# Patient Record
Sex: FEMALE | Race: WHITE | Employment: OTHER | URBAN - METROPOLITAN AREA
[De-identification: names, ages, dates, MRNs, and addresses within clinical notes are randomized per-mention and may not be internally consistent; named-entity substitution may affect disease eponyms.]

---

## 2022-07-19 ENCOUNTER — HOSPITAL ENCOUNTER (OUTPATIENT)
Dept: PHYSICAL THERAPY | Age: 67
Discharge: HOME | End: 2022-07-19
Payer: MEDICARE

## 2022-07-19 PROCEDURE — 97140 MANUAL THERAPY 1/> REGIONS: CPT

## 2022-07-19 PROCEDURE — 97110 THERAPEUTIC EXERCISES: CPT

## 2022-07-19 PROCEDURE — 97161 PT EVAL LOW COMPLEX 20 MIN: CPT

## 2022-07-19 NOTE — PROGRESS NOTES
PHYSICAL THERAPY - DAILY TREATMENT NOTE    Patient Name: Brittani Matute        Date: 2022  : 1955   YES Patient  Verified  Visit #:      12  Insurance: Payor: /      In time: 9:25 AM Out time: 10:15 AM   Total Treatment Time: 50 min      Medicare/[employer] Time Tracking (below)   Total Timed Codes (min):  24 min  1:1 Treatment Time:  50 min      TREATMENT AREA =  Shoulder pain, right [M25.511]  Left shoulder pain [M25.512]  Adhesive capsulitis of right shoulder [M75.01]  Adhesive capsulitis of left shoulder [M75.02]    SUBJECTIVE    Pain Level (on 0 to 10 scale):  2  / 10   Medication Changes/New allergies or changes in medical history, any new surgeries or procedures? NO    If yes, update Summary List   Subjective Functional Status/Changes:  []  No changes reported     See POC          OBJECTIVE    8 min Therapeutic Exercise:  [x]  See flow sheet   Rationale:      increase ROM and increase strength to improve the patients ability to perform ADLs with decreased symptoms and limitations. 10 min Manual Therapy: R shoulder GHJ mobs all planes gr 2-3, PROm ABD and ER   Rationale:      decrease pain, increase ROM and increase tissue extensibility to improve patient's ability to perform ADLs with decreased symptoms and limitations. The manual therapy interventions were performed at a separate and distinct time from the therapeutic activities interventions.     6  min Self Care: Reviewed diagnosis, prognosis, therapy progression   Rationale:    Improve understanding of injury and therapy to have realistic expectation of therapy to improve compliance/adherence and satisfaction    Billed With/As:   [x] TE   [] TA   [] Neuro   [x] Self Care Patient Education: [x] Review HEP    [] Progressed/Changed HEP based on:   [] positioning   [] body mechanics   [] transfers   [] heat/ice application    [] other:        Other Objective/Functional Measures:    Shown and performed HEP     Post Treatment Pain Level (on 0 to 10) scale:   4 / 10     ASSESSMENT  Assessment/Changes in Function:     See POC     []  See Progress Note/Recertification   Patient will continue to benefit from skilled PT services to modify and progress therapeutic interventions, address functional mobility deficits, address ROM deficits, address strength deficits, analyze and address soft tissue restrictions, analyze and cue movement patterns, analyze and modify body mechanics/ergonomics, assess and modify postural abnormalities, and instruct in home and community integration to attain goals per POC.    Progress toward goals / Updated goals:    See POC     PLAN  [x]  Upgrade activities as tolerated YES Continue plan of care   []  Discharge due to :    [x]  Other: 2x per week for 6 weeks     Therapist: Chet Gan, PT    Date: 7/19/2022 Time: 8:25 AM

## 2022-07-19 NOTE — PROGRESS NOTES
[address], [facility] [address],[facility], [address] [facility] - Phone: [phone]  Fax: 07-50-10-31 OF [facility] / [address]  Patient Name: Dewey Weiner : 1955   Medical   Diagnosis: Shoulder pain, right [M25.511]  Left shoulder pain [M25.512]  Adhesive capsulitis of right shoulder [M75.01]  Adhesive capsulitis of left shoulder [M75.02] Treatment Diagnosis: B shoulder pain   Onset Date: 2022     Referral Source: Charles Steen [facility]): 2022   Prior Hospitalization: See medical history Provider #: [id number]   Prior Level of Function: IND with all ADLs but does report prior bout of L arm pain in  which might have been similar   Comorbidities: Arthritis   Medications: Verified on Patient Summary List   The Plan of Care and following information is based on the information from the initial evaluation.   ===========================================================================================  Subjective: Dewey Weiner is a 69 y.o. F who presents to skilled PT with Dx of B shoulder pain. PAULA was gradual and just waking up with it. This had been going on since March. Pt had recent injections on  and  and that seemed to help out a lot. The shoulder pain has greatly reduced since then. Before, the pain was constant and depended on what she was doing. She reported of night pain. Night pain now is a lot better. Pt reports most difficulty with lifting the arms outstretched or lifting arms overhead. Feels the pain most in the ACJ area and acromion. Had MRIs for B shoulder showing some partial tears in B shoulders. Pt is R shoulder handed.     Pain:  Current: 2/10    Worst: 2/10 but 8/10 prior in injections   Best: 0/10    Objective:   FOTO score = 43 (an established functional score where 100 = no disability)    Posture: forward head posture, B forward shoulder with increased turgor in paraspinals and rhomboids and B UT     Functional Activity:  Functional ER - (L) T3 p!    (R) T1 no p! Functional IR - (L) T10 p!     (R) L1 p! Shoulder AROM   L flexion 146 deg tightness         R flexion 140 deg p! Coming down   L  deg discomfort     R  deg p! Shoulder PROM   L flexion 130 deg p! R flexion 140 deg   L  deg p!    R ABD 90 deg p! L ER 70 deg p!    R ER 40 deg p! L IR 70 deg p!     R IR 60 deg     Shoulder MMT in neutral   L flexion 4/5 p! R flexion 4+/5  L ABD 4/5    R ABD 4/5  L ER 4/5     R ER 4.5  L IR 4+/5     R IR 4/5 p!     (-) belly press test   (-) Er lag sign  (+) B shoulder impingement     Assessment / key information:  Judith Moeller is a 69 y.o. F who presents to skilled PT for the treatment diagnosis of B shoulder pain. Pt is still limited in all planes of motion for B shoulder with AROM and PROM due to tightness/restriction and pain. Her pain is resolving since the injections, but not completely gone limiting her functional mobility. Pt would benefit form skilled PT services addressing the current ROM, strength, functional performance, and balance impairments so that the patient to return to performing all ADLs with minimal restriction/limitation or without any functional limitations. HEP provided for the patient in order to promote improvement and self management for symptoms and functional performance:   Access Code: [email or web address]  URL: [email or web address]  Date: 07/19/2022  Prepared by: Chelsy Long    Exercises  Seated Shoulder Flexion Towel Slide at Table Top - 1-2 x daily - 7 x weekly - 10 reps - 3\" hold  Seated Shoulder Abduction Towel Slide at Table Top with Forearm in Neutral - 1-2 x daily - 7 x weekly - 10 reps - 3\" hold  Supine Shoulder External Rotation in 45 Degrees Abduction AAROM with Dowel - 1-2 x daily - 7 x weekly - 5 reps - 3\" hold      ===========================================================================================  Eval Complexity: History LOW Complexity : Zero comorbidities / personal factors that will impact the outcome / POC;  Examination  LOW Complexity : 1-2 Standardized tests and measures addressing body structure, function, activity limitation and / or participation in recreation ; Presentation LOW Complexity : Stable, uncomplicated ;  Decision Making MEDIUM Complexity : FOTO score of 26-74; Overall Complexity LOW   Problem List: pain affecting function, decrease ROM, decrease strength, decrease ADL/ functional abilitiies, decrease activity tolerance and decrease flexibility/ joint mobility   Treatment Plan may include any combination of the following: Therapeutic exercise, Therapeutic activities, Neuromuscular re-education, Physical agent/modality, Manual therapy, Patient education, Self Care training and Functional mobility training  Patient / Family readiness to learn indicated by: asking questions, trying to perform skills, and interest  Persons(s) to be included in education: patient (P)  Barriers to Learning/Limitations: None  Measures taken, if barriers to learning:    Patient Goal (s): \"permanent pain relief\"   Patient self reported health status: good  Rehabilitation Potential: good   Short Term Goals: To be accomplished in  3  weeks. 1) Pt will be IND with HEP to facilitate self care management. 2) Pt will improve B shoulder flexion AROM to >160 deg  And R shoulder ABD AROM to >130 deg with </= 3/10 pain to order to improve ability to reach overhead with ease    Long Term Goals: To be accomplished in  6 weeks. 1) Pt will improve B shoulder flexion/ABD AROM to >160 deg with </= 3/10  to order to improve ability to reach overhead with ease. 2) Pt will improve FOTO scores to 62 in order to show detectable change in overall function.    3) Pt will be able to perform reaching behind her back to T8 with </= 3/10 signifying improvement in overall functional capacity and activity tolerance. Frequency / Duration:   Patient to be seen  2  times per week for 6  weeks. Patient / Caregiver education and instruction: self care, activity modification, and exercises  Therapist Signature: oRse Mary Rodriguez PT Date: 0/60/2703   Certification Period: 7/19/2022 to 10/19/2022 Time: 8:24 AM   ===========================================================================================  I certify that the above Physical Therapy Services are being furnished while the patient is under my care. I agree with the treatment plan and certify that this therapy is necessary. Physician Signature:        Date:       Time:                                        Charles Genao  Please sign and return to [facility] AT [facility] at [facility] [place], [facility]. or you may fax the signed copy to [phone]. Thank you.

## 2022-07-26 ENCOUNTER — HOSPITAL ENCOUNTER (OUTPATIENT)
Dept: PHYSICAL THERAPY | Age: 67
Discharge: HOME | End: 2022-07-26
Payer: MEDICARE

## 2022-07-26 PROCEDURE — 97140 MANUAL THERAPY 1/> REGIONS: CPT

## 2022-07-26 PROCEDURE — 97110 THERAPEUTIC EXERCISES: CPT

## 2022-07-26 NOTE — PROGRESS NOTES
PHYSICAL THERAPY - DAILY TREATMENT NOTE    Patient Name: Lowell Brar        Date: 2022  : 1955   yes Patient  Verified  Visit #:      12  Insurance: Payor: [employer] / Plan: [place] MEDICARE PART A & B / Product Type: Medicare /      In time: 950 Out time: 1055   Total Treatment Time: 65     Medicare/BCBS Time Tracking (below)   Total Timed Codes (min):  65 1:1 Treatment Time:  55     TREATMENT AREA =  Shoulder pain, right [M25.511]  Left shoulder pain [M25.512]  Adhesive capsulitis of right shoulder [M75.01]  Adhesive capsulitis of left shoulder [M75.02]    SUBJECTIVE  Pain Level (on 0 to 10 scale):  4  / 10   Medication Changes/New allergies or changes in medical history, any new surgeries or procedures? yes  If yes, update Summary List   Subjective Functional Status/Changes:  []  No changes reported     I can feel the tightness in both my shoulders. Patient feels significantly better since the injections. Reports compliance with HEP but continues to feel the tightness.          OBJECTIVE  Modalities Rationale:     decrease inflammation, decrease pain, and increase tissue extensibility to improve patient's ability to perform general ADLs with UEs and decrease c/o symptoms   min [] Estim, type/location:                                      []  att     []  unatt     []  w/US     []  w/ice    []  w/heat    min []  Mechanical Traction: type/lbs                   []  pro   []  sup   []  int   []  cont    []  before manual    []  after manual    min []  Ultrasound, settings/location:      min []  Iontophoresis w/ dexamethasone, location:                                               []  take home patch       []  in clinic   10 min [x]  Ice     []  Heat    location/position: Supine (B) shoulders    min []  Vasopneumatic Device, press/temp:    If using vaso (only need to measure limb vaso being performed on)      pre-treatment girth :       post-treatment girth :       measured at (landmark location) :      min []  Other:    [x] Skin assessment post-treatment (if applicable):    [x]  intact    []  redness- no adverse reaction                  []redness - adverse reaction:      25 min Therapeutic Exercise:  [x]  See flow sheet   Rationale:      increase ROM, increase strength, and improve coordination to improve the patients ability to perform general ADLs with UEs and decrease c/o symptoms     30 min Manual Therapy: (B) UE: DTM UT/supra, CFm along biceps tenon region, PROM in all directions, grade 2 GH mob. Rationale:      decrease pain, increase ROM, and increase tissue extensibility to improve patient's ability to perform general ADLs with UEs and decrease c/o symptoms  The manual therapy interventions were performed at a separate and distinct time from the therapeutic activities interventions. Billed With/As:   [x] TE   [] TA   [] Neuro   [] Self Care Patient Education: [x] Review HEP    [] Progressed/Changed HEP based on:   [] positioning   [] body mechanics   [] transfers   [] heat/ice application    [] other:      Other Objective/Functional Measures:    No change in functional measurements today. Patient reports an increase in her ablitlity to perform all general ADLs. Post Treatment Pain Level (on 0 to 10) scale:   0  / 10     ASSESSMENT  Assessment/Changes in Function:     Overall good tolerance to all therapeutic interventions for first treatment session. Advised patient of DOMS and to use ice prn 10 - 15 minutes. []  See Progress Note/Recertification   Patient will continue to benefit from skilled PT services to modify and progress therapeutic interventions, address functional mobility deficits, address ROM deficits, address strength deficits, analyze and address soft tissue restrictions, and analyze and cue movement patterns to attain remaining goals. Progress toward goals / Updated goals:    Short Term Goals: To be accomplished in  3  weeks.   1) Pt will be IND with HEP to facilitate self care management. 2) Pt will improve B shoulder flexion AROM to >160 deg  And R shoulder ABD AROM to >130 deg with </= 3/10 pain to order to improve ability to reach overhead with ease   Long Term Goals: To be accomplished in  6 weeks. 1) Pt will improve B shoulder flexion/ABD AROM to >160 deg with </= 3/10  to order to improve ability to reach overhead with ease. 2) Pt will improve FOTO scores to 62 in order to show detectable change in overall function. 3) Pt will be able to perform reaching behind her back to T8 with </= 3/10 signifying improvement in overall functional capacity and activity tolerance. No change toward goals today.      PLAN  []  Upgrade activities as tolerated yes Continue plan of care   []  Discharge due to :    []  Other:      Therapist: Mckenna Bergman PTA    Date: 7/26/2022 Time: 7:26 AM     Future Appointments   Date Time Provider Noy Garcia   7/26/2022  9:45 AM Cathy De Jesus [facility]   7/29/2022  9:45 AM Larry Esparza, PT [id number]   8/2/2022  8:30 AM Admire Netter, PT [facility]   8/4/2022  8:30 AM Admire Netter, PT [facility]   8/9/2022  8:30 AM Admire Netter, PT [facility]   8/11/2022  8:30 AM Admire Netter, PT [facility]   8/16/2022  8:30 AM Admire Netter, PT [facility]   8/18/2022  8:30 AM Admire Netter, PT [facility]   8/23/2022  8:30 AM Admire Netter, PT [facility]   8/25/2022  8:30 AM Admire Netter, PT [facility]

## 2022-07-29 ENCOUNTER — HOSPITAL ENCOUNTER (OUTPATIENT)
Dept: PHYSICAL THERAPY | Age: 67
Discharge: HOME | End: 2022-07-29
Payer: MEDICARE

## 2022-07-29 PROCEDURE — 97140 MANUAL THERAPY 1/> REGIONS: CPT

## 2022-07-29 PROCEDURE — 97110 THERAPEUTIC EXERCISES: CPT

## 2022-07-29 NOTE — PROGRESS NOTES
PHYSICAL THERAPY - DAILY TREATMENT NOTE    Patient Name: Binta Pabon        Date: 2022  : 1955   yes Patient  Verified  Visit #:   3   of   12  Insurance: Payor: Malka Álvarez / Plan: [place] MEDICARE PART A & B / Product Type: Medicare /      In time: 9:45 am Out time: 10:45 am   Total Treatment Time: 60     Medicare/BCBS Time Tracking (below)   Total Timed Codes (min):  50 1:1 Treatment Time:  50     TREATMENT AREA =  Shoulder pain, right [M25.511]  Left shoulder pain [M25.512]  Adhesive capsulitis of right shoulder [M75.01]  Adhesive capsulitis of left shoulder [M75.02]    SUBJECTIVE  Pain Level (on 0 to 10 scale):  1  / 10   Medication Changes/New allergies or changes in medical history, any new surgeries or procedures?    no  If yes, update Summary List   Subjective Functional Status/Changes:  []  No changes reported     Patient reports feeling \"pinchy\" and uncomfortable in her shoulder. Stiffness remains. States it is more discomfort than true pain. OBJECTIVE  Modalities Rationale:     decrease inflammation and decrease pain to improve patient's ability to reach over head, lift and carry items.    min [] Estim, type/location:                                      []  att     []  unatt     []  w/US     []  w/ice    []  w/heat    min []  Mechanical Traction: type/lbs                   []  pro   []  sup   []  int   []  cont    []  before manual    []  after manual    min []  Ultrasound, settings/location:      min []  Iontophoresis w/ dexamethasone, location:                                               []  take home patch       []  in clinic   10 min [x]  Ice     []  Heat    location/position: Bilateral shoulders, longsitting    min []  Vasopneumatic Device, press/temp:    If using vaso (only need to measure limb vaso being performed on)      pre-treatment girth :       post-treatment girth :       measured at (landmark location) :      min []  Other:    [x] Skin assessment post-treatment (if applicable):    [x]  intact    []  redness- no adverse reaction                  []redness - adverse reaction:      20 min Therapeutic Exercise:  [x]  See flow sheet   Rationale:      increase ROM and increase strength to improve the patients ability to each over head, lift and carry items. 30 min Manual Therapy: PROM to bilateral shoulders, trigger point release to bilateral lats. Rationale:      decrease pain, increase ROM, increase tissue extensibility, decrease trigger points, and increase postural awareness to improve patient's ability to each over head, lift and carry items. The manual therapy interventions were performed at a separate and distinct time from the therapeutic activities interventions. Billed With/As:   [x] TE   [] TA   [] Neuro   [] Self Care Patient Education: [x] Review HEP    [] Progressed/Changed HEP based on:   [] positioning   [] body mechanics   [] transfers   [] heat/ice application    [] other:      Other Objective/Functional Measures:    Verbal or Tactile cues required: TC used for good scap setting for therex. Vcs required for counting and arm positioning  Posture/positioning: Good and upright with 3 VCs  ROM: Right Flexion 170 deg; Scap 165 deg; IR to hip, ER to 80 deg; Left Flexion 170 deg; Scap 170 deg; IR to hip; ER 90 deg  Palpation: Lats moderately TTP with multiple mTrPs. Therex and manual therapies as per flow sheet. Post Treatment Pain Level (on 0 to 10) scale:   2-3  / 10     ASSESSMENT  Assessment/Changes in Function:     Patient tolerated today's treatment well. Progressed/added therex as follows: doorway stretch, and cane extension and IR. Discussed added HEP with patient. Patient is agreeable. Continue to progress as tolerated.      []  See Progress Note/Recertification   Patient will continue to benefit from skilled PT services to modify and progress therapeutic interventions, address functional mobility deficits, address ROM deficits, address strength deficits, analyze and address soft tissue restrictions, analyze and cue movement patterns, analyze and modify body mechanics/ergonomics, and assess and modify postural abnormalities to attain remaining goals. Progress toward goals / Updated goals:    Patient making good progress towards all goals at this time. ROM improving well. Strength improving. All goals remain applicable.      PLAN  [x]  Upgrade activities as tolerated yes Continue plan of care   []  Discharge due to :    []  Other:      Therapist: Aleta Loza PT    Date: 7/29/2022 Time: 9:49 AM     Future Appointments   Date Time Provider Noy Garcia   8/2/2022  8:30 AM Kiara Cord, PT [facility]   8/4/2022  8:30 AM Kiara Cord, PT [facility]   8/9/2022  8:30 AM Kiara Cord, PT [facility]   8/11/2022  8:30 AM Kiara Cord, PT [facility]   8/16/2022  8:30 AM Kiara Cord, PT [facility]   8/18/2022  8:30 AM Kiara Cord, PT [facility]   8/23/2022  8:30 AM Kiara Cord, PT [facility]   8/25/2022  8:30 AM Kiara Cord, PT [facility]

## 2022-08-02 ENCOUNTER — HOSPITAL ENCOUNTER (OUTPATIENT)
Dept: PHYSICAL THERAPY | Age: 67
Discharge: HOME | End: 2022-08-02
Payer: MEDICARE

## 2022-08-02 PROCEDURE — 97110 THERAPEUTIC EXERCISES: CPT

## 2022-08-02 PROCEDURE — 97140 MANUAL THERAPY 1/> REGIONS: CPT

## 2022-08-02 NOTE — PROGRESS NOTES
PHYSICAL THERAPY - DAILY TREATMENT NOTE    Patient Name: Louise Portillo        Date: 2022  : 1955   yes Patient  Verified  Visit #:     Insurance: Payor: Shira Orourke / Plan: [place] MED[place] PART [place] & B / Product Type: Medicare /      In time: 8:30 AM Out time: 920   Total Treatment Time: 50     Medicare/BCBS Time Tracking (below)   Total Timed Codes (min):  40 1:1 Treatment Time:  50     TREATMENT AREA =  Shoulder pain, right [M25.511]  Left shoulder pain [M25.512]  Adhesive capsulitis of right shoulder [M75.01]  Adhesive capsulitis of left shoulder [M75.02]    SUBJECTIVE  Pain Level (on 0 to 10 scale):  1  / 10   Medication Changes/New allergies or changes in medical history, any new surgeries or procedures?    no  If yes, update Summary List   Subjective Functional Status/Changes:  []  No changes reported     Patient reports mostly soreness after the PT. Feels like more muscle sore. Not pain. Just sore. Still having trouble withj ROM and mobility           OBJECTIVE  Modalities Rationale:     decrease edema, decrease inflammation, and decrease pain to improve patient's ability to perform ADLs with decreased pain and improved mobility.    min [] Estim, type/location:                                      []  att     []  unatt     []  w/US     []  w/ice    []  w/heat    min []  Mechanical Traction: type/lbs                   []  pro   []  sup   []  int   []  cont    []  before manual    []  after manual    min []  Ultrasound, settings/location:      min []  Iontophoresis w/ dexamethasone, location:                                               []  take home patch       []  in clinic   10 min [x]  Ice     []  Heat    location/position: ON incline: B shoulder    min []  Vasopneumatic Device, press/temp:    If using vaso (only need to measure limb vaso being performed on)      pre-treatment girth :       post-treatment girth :       measured at (landmark location) :      min []  Other:    [] Skin assessment post-treatment (if applicable):    []  intact    []  redness- no adverse reaction                  []redness - adverse reaction:      10 min Therapeutic Exercise:  [x]  See flow sheet  B LAX ball to post cuff 30\" each   Rationale:      increase ROM, increase strength, improve coordination, improve balance, and increase proprioception to improve the patients ability to perform general ADLs with decrease c/o symptoms and with improved functional performance. 30 min Manual Therapy: R shoulder: TPR post cuff, lat/CFM post capsule/GHJ AP and inf mob gr 3-4/stretching ABD/ER   Rationale:      decrease pain, increase ROM, increase tissue extensibility, decrease edema , and decrease trigger points to improve patient's ability to perform general ADLs with decrease c/o symptoms and with improved functional performance. The manual therapy interventions were performed at a separate and distinct time from the therapeutic activities interventions. Billed With/As:   [x] TE   [] TA   [] Neuro   [] Self Care Patient Education: [x] Review HEP    [] Progressed/Changed HEP based on:   [] positioning   [] body mechanics   [] transfers   [x] heat/ice application    [x] other: discussion on exercises to improve functional IR     Other Objective/Functional Measures:    R shoulder AORM   Flexion 149 deg   Functional IR T10    L shoulder AROM   Flexion 159   Functional IR T7     Post Treatment Pain Level (on 0 to 10) scale:   2  / 10     ASSESSMENT  Assessment/Changes in Function:     Patient tolerated today's treatment well with notbale improvement in R shoulder mobility post manual. L shoulder better stiffer post manual due to focus mostly on R shoulder today due to that being the more limited shoulder in all planes. Pt shown several exercise she can include to work on functional IR: LAX ball to post cuff and post cuff stretching.      []  See Progress Note/Recertification   Patient will continue to benefit from skilled PT services to modify and progress therapeutic interventions, address functional mobility deficits, address ROM deficits, address strength deficits, analyze and address soft tissue restrictions, analyze and cue movement patterns, analyze and modify body mechanics/ergonomics, assess and modify postural abnormalities, and instruct in home and community integration to attain remaining goals. Progress toward goals / Updated goals:    Patient is making steady progress towards their goals at this time. Her pain free ROM is improving showing progress to STG and LTG. All goals remain applicable at this time.      PLAN  [x]  Upgrade activities as tolerated yes Continue plan of care   []  Discharge due to :    []  Other:      Therapist: Shree Rm PT    Date: 8/2/2022 Time: 7:33 AM     Future Appointments   Date Time Provider Noy Garcia   8/2/2022  8:30 AM Elnita Grief, PT [facility]   8/4/2022  8:30 AM Elnita Grief, PT [facility]   8/9/2022  8:30 AM Elnita Grief, PT [facility]   8/11/2022  8:30 AM Elnita Grief, PT [facility]   8/16/2022  8:30 AM Elnita Grief, PT [facility]   8/18/2022  8:30 AM Elnita Grief, PT [facility]   8/23/2022  8:30 AM Elnita Grief, PT [facility]   8/25/2022  8:30 AM Elnita Grief, PT [facility]

## 2022-08-04 ENCOUNTER — HOSPITAL ENCOUNTER (OUTPATIENT)
Dept: PHYSICAL THERAPY | Age: 67
Discharge: HOME | End: 2022-08-04
Payer: MEDICARE

## 2022-08-04 PROCEDURE — 97140 MANUAL THERAPY 1/> REGIONS: CPT

## 2022-08-04 PROCEDURE — 97110 THERAPEUTIC EXERCISES: CPT

## 2022-08-04 NOTE — PROGRESS NOTES
PHYSICAL THERAPY - DAILY TREATMENT NOTE    Patient Name: Judith Moeller        Date: 2022  : 1955   yes Patient  Verified  Visit #:      12  Insurance: Payor: Moris Reynolds / Plan: [place] MEDICARE PART A & B / Product Type: Medicare /      In time: 8:31 AM Out time: 921 AM   Total Treatment Time: 50     Medicare/BCBS Time Tracking (below)   Total Timed Codes (min):  40 50     TREATMENT AREA =  Shoulder pain, right [M25.511]  Left shoulder pain [M25.512]  Adhesive capsulitis of right shoulder [M75.01]  Adhesive capsulitis of left shoulder [M75.02]    SUBJECTIVE  Pain Level (on 0 to 10 scale):  3  / 10   Medication Changes/New allergies or changes in medical history, any new surgeries or procedures?    no  If yes, update Summary List   Subjective Functional Status/Changes:  []  No changes reported     Patient reports she really had no issue with the R shoulder. But the L shoulder was bothering her. OBJECTIVE  Modalities Rationale:     decrease edema, decrease inflammation, and decrease pain to improve patient's ability to perform ADLs with decreased pain and improved mobility.    min [] Estim, type/location:                                      []  att     []  unatt     []  w/US     []  w/ice    []  w/heat    min []  Mechanical Traction: type/lbs                   []  pro   []  sup   []  int   []  cont    []  before manual    []  after manual    min []  Ultrasound, settings/location:      min []  Iontophoresis w/ dexamethasone, location:                                               []  take home patch       []  in clinic   10 min [x]  Ice     []  Heat    location/position: Incline B shoulders    min []  Vasopneumatic Device, press/temp:    If using vaso (only need to measure limb vaso being performed on)      pre-treatment girth :       post-treatment girth :       measured at (landmark location) :      min []  Other:    [] Skin assessment post-treatment (if applicable):    []  intact    [] redness- no adverse reaction                  []redness - adverse reaction:      15 min Therapeutic Exercise:  [x]  See flow sheet   Rationale:      increase ROM, increase strength, improve coordination, improve balance, and increase proprioception to improve the patients ability to perform general ADLs with decrease c/o symptoms and with improved functional performance. 30 min Manual Therapy: B shoulder: pin GHJ AP and inf mob gr 3-4/stretching ABD   Rationale:      decrease pain, increase ROM, increase tissue extensibility, decrease edema , and decrease trigger points to improve patient's ability to perform general ADLs with decrease c/o symptoms and with improved functional performance. The manual therapy interventions were performed at a separate and distinct time from the therapeutic activities interventions. Billed With/As:   [x] TE   [] TA   [] Neuro   [] Self Care Patient Education: [x] Review HEP    [] Progressed/Changed HEP based on:   [] positioning   [] body mechanics   [] transfers   [] heat/ice application    [] other:      Other Objective/Functional Measures:    L shoulder AROM   Flexion 125 deg    deg p! Post tx 150 deg     R shoulder AROM   Flexion 145 deg   Abd 125 deg      Post Treatment Pain Level (on 0 to 10) scale:   2  / 10     ASSESSMENT  Assessment/Changes in Function:     Patient tolerated today's treatment well. Very stiff inf capsule B shoulde rlimiting ABD AROM and PROM. Tolerated joint mobs well but did have some discomfort. Good imrpovement noted in L shoulder to 150 deg but minimal to no change in R shoulder. Will cont to address mobility impairments in future sessions .       []  See Progress Note/Recertification   Patient will continue to benefit from skilled PT services to modify and progress therapeutic interventions, address functional mobility deficits, address ROM deficits, address strength deficits, analyze and address soft tissue restrictions, analyze and cue movement patterns, analyze and modify body mechanics/ergonomics, assess and modify postural abnormalities, and instruct in home and community integration to attain remaining goals. Progress toward goals / Updated goals:    Patient is making steady progress towards their goals at this time. The ROM is improving but flutuates session to sesssion, but is making progress towards pain goals and ROM goals. All goals remain applicable at this time.      PLAN  [x]  Upgrade activities as tolerated yes Continue plan of care   []  Discharge due to :    []  Other:      Therapist: Lucy Stuart, PT    Date: 8/4/2022 Time: 6:53 AM     Future Appointments   Date Time Provider Noy Garcia   8/4/2022  8:30 AM Kevin Contra Costa,  [address] [facility]   8/9/2022  8:30 AM Kevin Contra Costa,  [facility]   8/11/2022  8:30 AM Kevin Contra Costa,  [address] [facility]   8/16/2022  8:30 AM Kevin Contra Costa,  [address] [facility]   8/18/2022  8:30 AM Kevin Contra Costa,  [address] [facility]   8/23/2022  8:30 AM Kevin Contra Costa,  [address] [facility]   8/25/2022  8:30 AM Kevin Contra Costa,  [address] [facility]

## 2022-08-09 ENCOUNTER — HOSPITAL ENCOUNTER (OUTPATIENT)
Dept: PHYSICAL THERAPY | Age: 67
Discharge: HOME | End: 2022-08-09
Payer: MEDICARE

## 2022-08-09 PROCEDURE — 97110 THERAPEUTIC EXERCISES: CPT

## 2022-08-09 PROCEDURE — 97140 MANUAL THERAPY 1/> REGIONS: CPT

## 2022-08-09 NOTE — PROGRESS NOTES
PHYSICAL THERAPY - DAILY TREATMENT NOTE    Patient Name: Braulio Russell        Date: 2022  : 1955   yes Patient  Verified  Visit #:     Insurance: Payor: Charlie Alt / Plan: [place] MEDICARE PART A & B / Product Type: Medicare /      In time: 8:30 AM Out time: 9:30 AM    Total Treatment Time: 60      Medicare/BCBS Time Tracking (below)   Total Timed Codes (min):  50 1:1 Treatment Time:  60     TREATMENT AREA =  Shoulder pain, right [M25.511]  Left shoulder pain [M25.512]  Adhesive capsulitis of right shoulder [M75.01]  Adhesive capsulitis of left shoulder [M75.02]    SUBJECTIVE  Pain Level (on 0 to 10 scale):  R 1  / 10 and L 3/10   Medication Changes/New allergies or changes in medical history, any new surgeries or procedures?    no  If yes, update Summary List   Subjective Functional Status/Changes:  []  No changes reported     Patient reports the L one has really bothered her since Thursday. Cannot do the pec stretch. Felt like something was off. Sometimes can be sitting there and she is aware it hurts. Sometimes feels catchy but not sharp. The right one still feels tightness. Does not hurt, but just feels tight. OBJECTIVE  Modalities Rationale:     decrease edema, decrease inflammation, and decrease pain to improve patient's ability to perform ADLs with decreased pain and improved mobility.    min [] Estim, type/location:                                      []  att     []  unatt     []  w/US     []  w/ice    []  w/heat    min []  Mechanical Traction: type/lbs                   []  pro   []  sup   []  int   []  cont    []  before manual    []  after manual    min []  Ultrasound, settings/location:      min []  Iontophoresis w/ dexamethasone, location:                                               []  take home patch       []  in clinic   10 min []  Ice     []  Heat    location/position: Incline B shoulder    min []  Vasopneumatic Device, press/temp:    If using vaso (only need to measure limb vaso being performed on)      pre-treatment girth :       post-treatment girth :       measured at (landmark location) :      min []  Other:    [] Skin assessment post-treatment (if applicable):    []  intact    []  redness- no adverse reaction                  []redness - adverse reaction:      15 min Therapeutic Exercise:  [x]  See flow sheet  B shoulder stretching ABD and ER per tolerance    Rationale:      increase ROM, increase strength, improve coordination, improve balance, and increase proprioception to improve the patients ability to perform general ADLs with decrease c/o symptoms and with improved functional performance. 35 min Manual Therapy: B shoulder: TPR supraspinatus, GHJ 2-3 inf, R GHJ PA gr 3-4,  L GHJ lateral distraction, DTM of post cuff    Rationale:      decrease pain, increase ROM, increase tissue extensibility, decrease edema , and decrease trigger points to improve patient's ability to perform general ADLs with decrease c/o symptoms and with improved functional performance. The manual therapy interventions were performed at a separate and distinct time from the therapeutic activities interventions. Billed With/As:   [x] TE   [] TA   [] Neuro   [] Self Care Patient Education: [x] Review HEP    [] Progressed/Changed HEP based on:   [] positioning   [] body mechanics   [] transfers   [x] heat/ice application    [] other: activity modification and HEP modification depending on pain     Other Objective/Functional Measures:    L shoulder AROM  Flexion 151 deg p! Coming down    deg p! Coming down      R shoulder AROM  Flexion 142 deg tight post tx 158 deg   Abd 155 deg tight post tx 163 deg     TTP along distal supraspinatus  Tightness noted B posterior cuff      Post Treatment Pain Level (on 0 to 10) scale:   3  / 10     ASSESSMENT  Assessment/Changes in Function:     Patient tolerated today's treatment well without change in L shoulder pain.  Very TTP along L distal supraspinatus. GHJ lateral distraction gr 1-2 reproduced some lateral shoulder pain. Tolerated shoulder PROM but more pain with AROM in L shoulder. R shoulder more tight in the GHJ with no pain end range ABD but pain end range ER. Goo dchnage in ABD ROM post manual, but no change in end rnage pain RE post manual. Instructed on activtiy modification and HEP modification to allow pain to decrease. []  See Progress Note/Recertification   Patient will continue to benefit from skilled PT services to modify and progress therapeutic interventions, address functional mobility deficits, address ROM deficits, address strength deficits, analyze and address soft tissue restrictions, analyze and cue movement patterns, analyze and modify body mechanics/ergonomics, assess and modify postural abnormalities, and instruct in home and community integration to attain remaining goals. Progress toward goals / Updated goals:    Patient is making good progress towards their goals at this time. Imrpoving in shoulder ROM for R shoulder and L is lagging behind showing mild progress towards STG2 and LTG1. All goals remain applicable at this time.      PLAN  [x]  Upgrade activities as tolerated yes Continue plan of care   []  Discharge due to :    []  Other:      Therapist: Tran Haney PT    Date: 8/9/2022 Time: 7:37 AM     Future Appointments   Date Time Provider Noy Garcia   8/9/2022  8:30 AM Eliana Field PT [facility]   8/11/2022  8:30 AM Eliana Field PT [facility]   8/16/2022  8:30 AM Eliana Field PT [facility]   8/18/2022  8:30 AM Eliana Field PT [facility]   8/23/2022  8:30 AM Eliana Field PT [facility]   8/25/2022  8:30 AM Eliana Field PT [facility]

## 2022-08-11 ENCOUNTER — HOSPITAL ENCOUNTER (OUTPATIENT)
Dept: PHYSICAL THERAPY | Age: 67
Discharge: HOME | End: 2022-08-11
Payer: MEDICARE

## 2022-08-11 PROCEDURE — 97110 THERAPEUTIC EXERCISES: CPT

## 2022-08-11 PROCEDURE — 97140 MANUAL THERAPY 1/> REGIONS: CPT

## 2022-08-11 NOTE — PROGRESS NOTES
PHYSICAL THERAPY - DAILY TREATMENT NOTE    Patient Name: Vicente Moses        Date: 2022  : 1955   yes Patient  Verified  Visit #:     Insurance: Payor: Etienne Shaver / Plan: [place] MEDICARE PART A & B / Product Type: Medicare /      In time: 274 Out time: 928   Total Treatment Time: 58     Medicare/BCBS Time Tracking (below)   Total Timed Codes (min):  48 58     TREATMENT AREA =  Shoulder pain, right [M25.511]  Left shoulder pain [M25.512]  Adhesive capsulitis of right shoulder [M75.01]  Adhesive capsulitis of left shoulder [M75.02]    SUBJECTIVE  Pain Level (on 0 to 10 scale):  L3  / 10 R 1/10   Medication Changes/New allergies or changes in medical history, any new surgeries or procedures?    no  If yes, update Summary List   Subjective Functional Status/Changes:  []  No changes reported     Patient reports the L shoulder is about the same. The R shoulder is doing pretty good. Took an 800 mg Ibiuprofen before which she usually did. Every time she moved her arm in a certain position it clicked          OBJECTIVE  Modalities Rationale:     decrease edema, decrease inflammation, and decrease pain to improve patient's ability to perform ADLs with decreased pain and improved mobility.                 min [] Estim, type/location:                                                                 []  att     []  unatt     []  w/US     []  w/ice    []  w/heat     min []  Mechanical Traction: type/lbs                    []  pro   []  sup   []  int   []  cont    []  before manual    []  after manual     min []  Ultrasound, settings/location:        min []  Iontophoresis w/ dexamethasone, location:                                                []  take home patch       []  in clinic   10 min [x]  Ice     []  Heat    location/position: Incline B shoulder     min []  Vasopneumatic Device, press/temp:     If using vaso (only need to measure limb vaso being performed on)      pre-treatment girth : post-treatment girth :      measured at (landmark location) :       min []  Other:     [] Skin assessment post-treatment (if applicable):    []  intact    []  redness- no adverse reaction                  []redness - adverse reaction:      10 min Therapeutic Exercise:  [x]  See flow sheet  B shoulder stretching ABD and ER per tolerance    Rationale:      increase ROM, increase strength, improve coordination, improve balance, and increase proprioception to improve the patients ability to perform general ADLs with decrease c/o symptoms and with improved functional performance. 38 min Manual Therapy: L shoulder: TPR supraspinatus, subsacp, and post cuff, tired IASTM to shoulder but did not tolerate well , GHJ 1-2 inf, taping to shoulder two strips tension along infra and supra muscle bellies tension off distal RC tendon    R shoulder: tacking subscap, STM of pec major, GHJ mobs all planes gr 3-4    Rationale:      decrease pain, increase ROM, increase tissue extensibility, decrease edema , and decrease trigger points to improve patient's ability to perform general ADLs with decrease c/o symptoms and with improved functional performance. The manual therapy interventions were performed at a separate and distinct time from the therapeutic activities interventions. Billed With/As:   [x] TE   [] TA   [] Neuro   [] Self Care Patient Education: [x] Review HEP    [] Progressed/Changed HEP based on:   [] positioning   [] body mechanics   [] transfers   [] heat/ice application    [] other:      Other Objective/Functional Measures:    L shoulder   (-) drop arm but pain lowering the arm     TTP along L distal RC    R shoulder PROM   ABD ~160 deg      Post Treatment Pain Level (on 0 to 10) scale:   L 1-2  / 10 and R 1/10     ASSESSMENT  Assessment/Changes in Function:     Patient tolerated today's treatment well. No signficant adverse effects. Still very Ttp along distal RC tendon on L.  Tried IASTm but did not tolerate. Taped L shoulder which which seemed to help with active mobility with much less pain. R shoulder mostly just stiff but improving PROM noted for ABD and Er improved post manual of active IR muscles. Advised on HEP for cane Er and wall stretches for cont mobility      []  See Progress Note/Recertification   Patient will continue to benefit from skilled PT services to modify and progress therapeutic interventions, address functional mobility deficits, address ROM deficits, address strength deficits, analyze and address soft tissue restrictions, analyze and cue movement patterns, analyze and modify body mechanics/ergonomics, assess and modify postural abnormalities, and instruct in home and community integration to attain remaining goals. Progress toward goals / Updated goals:    Patient is making good progress towards their goals at this time. Pain still relatively low but active inhibited in  the L shoulder. Better with tape allowing for more active mobility with less pain aiding in reaching STG and LTG. All goals remain applicable at this time.      PLAN  [x]  Upgrade activities as tolerated yes Continue plan of care   []  Discharge due to :    []  Other:      Therapist: Kit Morales PT    Date: 8/11/2022 Time: 8:21 AM     Future Appointments   Date Time Provider Noy Garcia   8/11/2022  8:30 AM Corbin Tejada PT [facility]   8/16/2022  8:30 AM Corbin Tejada PT [facility]   8/18/2022  8:30 AM Corbin Tejada PT [facility]   8/23/2022  8:30 AM Corbin Tejada PT [facility]   8/25/2022  8:30 AM Corbin Tejada PT [facility]

## 2022-08-16 ENCOUNTER — APPOINTMENT (OUTPATIENT)
Dept: PHYSICAL THERAPY | Age: 67
End: 2022-08-16
Payer: MEDICARE

## 2022-08-17 ENCOUNTER — HOSPITAL ENCOUNTER (OUTPATIENT)
Dept: PHYSICAL THERAPY | Age: 67
Discharge: HOME | End: 2022-08-17
Payer: MEDICARE

## 2022-08-17 PROCEDURE — 97535 SELF CARE MNGMENT TRAINING: CPT

## 2022-08-17 PROCEDURE — 97110 THERAPEUTIC EXERCISES: CPT

## 2022-08-17 NOTE — PROGRESS NOTES
PHYSICAL THERAPY - DAILY TREATMENT NOTE    Patient Name: Sheri Davenport        Date: 2022  : 1955   yes Patient  Verified  Visit #:     Insurance: Payor: Leilani Hein / Plan: [place] MEDICARE PART A & B / Product Type: Medicare /      In time: 4453 Out time: 105   Total Treatment Time: 50     Medicare/BCBS Time Tracking (below)   Total Timed Codes (min):  50 1:1 Treatment Time:  50     TREATMENT AREA =  Shoulder pain, right [M25.511]  Left shoulder pain [M25.512]  Adhesive capsulitis of right shoulder [M75.01]  Adhesive capsulitis of left shoulder [M75.02]    SUBJECTIVE  Pain Level (on 0 to 10 scale):  L3  / 10 R 1/10   Medication Changes/New allergies or changes in medical history, any new surgeries or procedures?    no  If yes, update Summary List   Subjective Functional Status/Changes:  []  No changes reported     Patient arrives with a new protocol from Dr. Marco Valiente. 2 low grade partial tears in (R) shoulder and 1high grade partial tear in (L) shoulder. OBJECTIVE  Modalities Rationale:     decrease edema, decrease inflammation, and decrease pain to improve patient's ability to perform ADLs with decreased pain and improved mobility.                 min [] Estim, type/location:                                                                 []  att     []  unatt     []  w/US     []  w/ice    []  w/heat     min []  Mechanical Traction: type/lbs                    []  pro   []  sup   []  int   []  cont    []  before manual    []  after manual     min []  Ultrasound, settings/location:        min []  Iontophoresis w/ dexamethasone, location:                                                []  take home patch       []  in clinic   10 min [x]  Ice     []  Heat    location/position: Incline B shoulder     min []  Vasopneumatic Device, press/temp:     If using vaso (only need to measure limb vaso being performed on)      pre-treatment girth :      post-treatment girth :      measured at (landmark location) :       min []  Other:     [] Skin assessment post-treatment (if applicable):    []  intact    []  redness- no adverse reaction                  []redness - adverse reaction:      15 min Therapeutic Exercise:  [x]  See flow sheet  B shoulder stretching ABD and ER per tolerance    Rationale:      increase ROM, increase strength, improve coordination, improve balance, and increase proprioception to improve the patients ability to perform general ADLs with decrease c/o symptoms and with improved functional performance. min Manual Therapy: (B) UE: DTM UT/supra, CFm along biceps tenon region, PROM in all directions, grade 2 GH mob. Rationale:      decrease pain, increase ROM, increase tissue extensibility, decrease edema , and decrease trigger points to improve patient's ability to perform general ADLs with decrease c/o symptoms and with improved functional performance. The manual therapy interventions were performed at a separate and distinct time from the therapeutic activities interventions. 35 min Self care:  [x]  See flow sheet  B shoulder stretching ABD and ER per tolerance    Rationale:      increase ROM, increase strength, improve coordination, improve balance, and increase proprioception to improve the patients ability to perform general ADLs with decrease c/o symptoms and with improved functional performance. Billed With/As:   [x] TE   [] TA   [] Neuro   [] Self Care Patient Education: [x] Review HEP    [] Progressed/Changed HEP based on:   [] positioning   [] body mechanics   [] transfers   [] heat/ice application    [] other:      Other Objective/Functional Measures: FOTO: 61    GROC: +6 a great deal better. Patient reports max pain 4/10, least pain 0/10, average pain 1-2/10    R shoulder PROM   ABD ~160 deg     L shoulder AROM  Flexion 151 deg p! Coming down    deg p!  Coming down      R shoulder AROM  Flexion 142 deg tight post tx 158 deg   Abd 155 deg tight post tx 163 deg     NICKY: (L) T1, (R) T1    FIR: (L) T4, (R) T6         Post Treatment Pain Level (on 0 to 10) scale:   L 3 / 10 and R 1/10     ASSESSMENT  Assessment/Changes in Function:     Patient reports ~80% overall improvement with performance of all functional mobility activities and performance of general ADLs,     Patient reports ~85% overall improvement with reduction of symptoms. Patient received HEP for shoulder isometric strengthening which was reviewed. Patient verbalized understanding and returned demonstration. []  See Progress Note/Recertification   Patient will continue to benefit from skilled PT services to modify and progress therapeutic interventions, address functional mobility deficits, address ROM deficits, address strength deficits, analyze and address soft tissue restrictions, analyze and cue movement patterns, analyze and modify body mechanics/ergonomics, assess and modify postural abnormalities, and instruct in home and community integration to attain remaining goals. Progress toward goals / Updated goals:    Short Term Goals: To be accomplished in  3  weeks. 1) Pt will be IND with HEP to facilitate self care management. 2) Pt will improve B shoulder flexion AROM to >160 deg  And R shoulder ABD AROM to >130 deg with </= 3/10 pain to order to improve ability to reach overhead with ease   Long Term Goals: To be accomplished in  6 weeks. 1) Pt will improve B shoulder flexion/ABD AROM to >160 deg with </= 3/10  to order to improve ability to reach overhead with ease. Progressing well 8/17/22  2) Pt will improve FOTO scores to 62 in order to show detectable change in overall function. 3) Pt will be able to perform reaching behind her back to T8 with </= 3/10 signifying improvement in overall functional capacity and activity tolerance.        PLAN  [x]  Upgrade activities as tolerated yes Continue plan of care   []  Discharge due to :    []  Other:      Therapist: Melonie Bunn Benji Corbett, PTA    Date: 8/17/2022 Time: 8:21 AM     Future Appointments   Date Time Provider Noy Garcia   8/17/2022 12:15 PM [facility]   8/19/2022 10:00 AM Vignesh Mooring, PTA [place] [address]   8/23/2022 10:00 AM Vignesh Mooring, PTA [place] [address]   8/25/2022 10:00 AM Vignesh Mooring, PTA [place] [address]

## 2022-08-17 NOTE — PROGRESS NOTES
[address], [address] [facility] - Phone: [phone]  Fax: [phone] OF CARE/RECERTIFICATION FOR PHYSICAL THERAPY          Patient Name: Kervin Brennan : 1955   Medical/Treatment Diagnosis: Shoulder pain, right [M25.511]  Left shoulder pain [M25.512]  Adhesive capsulitis of right shoulder [M75.01]  Adhesive capsulitis of left shoulder [M75.02]   Onset Date: 2022    [facility]   Prior Hospitalization: See Medical History Provider #: [id number]   Prior Level of Function: IND with all ADLs but does report prior bout of L arm pain in  which might have been similar   Comorbidities: Arthritis   Medications: Verified on Patient Summary List   Vis[facility] from [facility]: 8 Missed Visits: 1     Goal/Measure of Progress Goal Met? 1. Pt will improve B shoulder flexion/ABD AROM to >160 deg with </= 3/10  to order to improve ability to reach overhead with ease. Status at last Eval: Shoulder AROM   L flexion 146 deg tightness         R flexion 140 deg p! Coming down   L  deg discomfort     R  deg p! Current Status: L shoulder AROM  Flexion 151 deg p! Coming down    deg p! Coming down      R shoulder AROM  Flexion 142 deg tight post tx 158 deg   Abd 155 deg tight post tx 163 deg  Progressing well. 2.  Pt will improve FOTO scores to 62 in order to show detectable change in overall function. Status at last Eval: 43 Current Status: 59 Progressin point improvement   3. Pt will be able to perform reaching behind her back to T8 with </= 3/10 signifying improvement in overall functional capacity and activity tolerance. Status at last Eval: Functional ER - (L) T3 p!    (R) T1 no p!   Current Status: NICKY: (L) T1, (R) T1 yes     Key Functional Changes/Progress: Patient reports ~80% overall improvement with performance of all functional mobility activities and performance of general ADLs,  Patient reports ~85% overall improvement with reduction of symptoms. GROC: +6 a great deal better. Patient reports max pain 4/10, least pain 0/10, average pain 1-2/10. AROM FIR: (L) T4, (R) T6.    Problem List: pain affecting function, decrease ROM, decrease strength, decrease ADL/ functional abilitiies, decrease activity tolerance, and decrease flexibility/ joint mobility   Treatment Plan may include any combination of the following: Therapeutic exercise, Therapeutic activities, Neuromuscular re-education, Physical agent/modality, Gait/balance training, Manual therapy, Aquatic therapy, Patient education, and Functional mobility training  Patient Goal(s) has been updated and includes:     Goals for this certification period include and are to be achieved in  4 weeks   1) Pt will be able to lift >3 lbs overhead with </= 3/10 pain in order to lift plates overhead to put them in the cabinet with ease. 2) Pt will improve MMT to >/= 4+/5  for the shoulder demonstrated improved capacity for lifting heavier objects with outstretched arms. 3) Pt will be able to lift >20 lb from floor to waist without any shoulder pain improving capacity for lifting heavier objects. Frequency / Duration:   Patient to be seen   2  times per week for   4 weeks    Assessments/Recommendations: Continue to treat the patient addressing the remaining limitations/impairments which include increased pain, decreased shoulder ROM, decreased shoulder strength and decreased functional activity tolerance. If you have any questions/comments please contact us directly at [phone]. Thank you for allowing us to assist in the care of your patient.     Therapist Signature: AMENA Cordova/Chico Gomez PT, DPT Date: 3/65/4456   Certification Period:  Reporting Period: 7/19/2022 to 10/19/2022  7/19/22 - 8/17/22 Time: 4:08 PM   NOTE TO PHYSICIAN:  PLEASE COMPLETE THE ORDERS BELOW AND FAX TO   [facility] [place] Physical [facility]: [phone]  If you are unable to process this request in 24 hours please contact our office: [phone]    ___ I have read the above report and request that my patient continue as recommended.   ___ I have read the above report and request that my patient continue therapy with the following changes/special instructions: ________________________________________________   ___ I have read the above report and request that my patient be discharged from therapy.      Physician Signature:        Date:       Time:      Charles Hilliard

## 2022-08-18 ENCOUNTER — APPOINTMENT (OUTPATIENT)
Dept: PHYSICAL THERAPY | Age: 67
End: 2022-08-18
Payer: MEDICARE

## 2022-08-19 ENCOUNTER — HOSPITAL ENCOUNTER (OUTPATIENT)
Dept: PHYSICAL THERAPY | Age: 67
Discharge: HOME | End: 2022-08-19
Payer: MEDICARE

## 2022-08-19 PROCEDURE — 97110 THERAPEUTIC EXERCISES: CPT

## 2022-08-19 PROCEDURE — 97112 NEUROMUSCULAR REEDUCATION: CPT

## 2022-08-19 NOTE — PROGRESS NOTES
PHYSICAL THERAPY - DAILY TREATMENT NOTE    Patient Name: Raffy Garcia        Date: 2022  : 1955   yes Patient  Verified  Visit #:     Insurance: Payor: Waldo Lee / Plan: [place] MEDICARE PART A & B / Product Type: Medicare /      In time: 1000 Out time: 3284   Total Treatment Time: 45     Medicare/BCBS Time Tracking (below)   Total Timed Codes (min):  45 1:1 Treatment Time:  45     TREATMENT AREA =  Shoulder pain, right [M25.511]  Left shoulder pain [M25.512]  Adhesive capsulitis of right shoulder [M75.01]  Adhesive capsulitis of left shoulder [M75.02]    SUBJECTIVE  Pain Level (on 0 to 10 scale):  L3  / 10 R 1 /10   Medication Changes/New allergies or changes in medical history, any new surgeries or procedures?    no  If yes, update Summary List   Subjective Functional Status/Changes:  []  No changes reported     Patient presents narrative and impression of (B) shoulders from [facility]. OBJECTIVE  Modalities Rationale:     decrease edema, decrease inflammation, and decrease pain to improve patient's ability to perform ADLs with decreased pain and improved mobility.                 min [] Estim, type/location:                                                                 []  att     []  unatt     []  w/US     []  w/ice    []  w/heat     min []  Mechanical Traction: type/lbs                    []  pro   []  sup   []  int   []  cont    []  before manual    []  after manual     min []  Ultrasound, settings/location:        min []  Iontophoresis w/ dexamethasone, location:                                                []  take home patch       []  in clinic    min [x]  Ice     []  Heat    location/position: Incline B shoulder     min []  Vasopneumatic Device, press/temp:     If using vaso (only need to measure limb vaso being performed on)      pre-treatment girth :      post-treatment girth :      measured at (landmark location) :       min []  Other:     [] Skin assessment post-treatment (if applicable):    []  intact    []  redness- no adverse reaction                  []redness - adverse reaction:      30 min Therapeutic Exercise:  [x]  See flow sheet  B shoulder stretching ABD and ER per tolerance    Rationale:      increase ROM, increase strength, improve coordination, improve balance, and increase proprioception to improve the patients ability to perform general ADLs with decrease c/o symptoms and with improved functional performance. min Manual Therapy: (B) UE: DTM UT/supra, CFm along biceps tenon region, PROM in all directions, grade 2 GH mob. Rationale:      decrease pain, increase ROM, increase tissue extensibility, decrease edema , and decrease trigger points to improve patient's ability to perform general ADLs with decrease c/o symptoms and with improved functional performance. The manual therapy interventions were performed at a separate and distinct time from the therapeutic activities interventions. 15 min Neuromuscular re-ed:  [x]  See flow sheet  B shoulder stretching ABD and ER per tolerance    Rationale:      increase ROM, increase strength, improve coordination, improve balance, and increase proprioception to improve the patients ability to perform general ADLs with decrease c/o symptoms and with improved functional performance. Billed With/As:   [x] TE   [] TA   [] Neuro   [] Self Care Patient Education: [x] Review HEP    [] Progressed/Changed HEP based on:   [] positioning   [] body mechanics   [] transfers   [] heat/ice application    [] other:      Other Objective/Functional Measures:    Continue with iso for (B) shoulders, add supine ER (B) PTB 10 x 5 seconds  1/2 lever arm shoulder flexion with 1 # 10x (B)         Post Treatment Pain Level (on 0 to 10) scale:   L 2 / 10 and R 1 /10     ASSESSMENT  Assessment/Changes in Function:     Good to all therx intervention. No increase of shoulder pain.   Initiated slow strengthening/stability exercise        [] See Progress Note/Recertification   Patient will continue to benefit from skilled PT services to modify and progress therapeutic interventions, address functional mobility deficits, address ROM deficits, address strength deficits, analyze and address soft tissue restrictions, analyze and cue movement patterns, analyze and modify body mechanics/ergonomics, assess and modify postural abnormalities, and instruct in home and community integration to attain remaining goals. Progress toward goals / Updated goals:    Goals for this certification period include and are to be achieved in  4 weeks   1) Pt will be able to lift >3 lbs overhead with </= 3/10 pain in order to lift plates overhead to put them in the cabinet with ease. 2) Pt will improve MMT to >/= 4+/5  for the shoulder demonstrated improved capacity for lifting heavier objects with outstretched arms. 3) Pt will be able to lift >20 lb from floor to waist without any shoulder pain improving capacity for lifting heavier objects.         PLAN  [x]  Upgrade activities as tolerated yes Continue plan of care   []  Discharge due to :    []  Other:      Therapist: Jameson Schwab, PTA    Date: 8/19/2022 Time: 8:21 AM     Future Appointments   Date Time Provider Noy Garcia   8/19/2022 10:00 AM Terrell Molt, [facility]   8/23/2022 10:00 AM Terrell Molt, [facility]   8/25/2022 10:00 AM Terrell Molt, PTA [facility]   8/30/2022 10:00 AM Terrell Molt, PTA [facility]   9/1/2022 10:45 AM Terrell Molt, PTA [facility]   9/6/2022 11:30 AM Terrell Molt, PTA [facility]   9/8/2022  9:15 AM Terrell Molt, PTA [facility]   9/13/2022  9:45 AM Terrell Molt, PTA [facility]   9/15/2022  9:15 AM Terrell Molt, PTA [facility]   9/20/2022  9:15 AM Royce Mcdonald PTA [facility]   9/22/2022  9:15 AM [facility]

## 2022-08-23 ENCOUNTER — HOSPITAL ENCOUNTER (OUTPATIENT)
Dept: PHYSICAL THERAPY | Age: 67
Discharge: HOME | End: 2022-08-23
Payer: MEDICARE

## 2022-08-23 ENCOUNTER — APPOINTMENT (OUTPATIENT)
Dept: PHYSICAL THERAPY | Age: 67
End: 2022-08-23
Payer: MEDICARE

## 2022-08-23 PROCEDURE — 97110 THERAPEUTIC EXERCISES: CPT

## 2022-08-23 PROCEDURE — 97112 NEUROMUSCULAR REEDUCATION: CPT

## 2022-08-23 NOTE — PROGRESS NOTES
PHYSICAL THERAPY - DAILY TREATMENT NOTE    Patient Name: Saqib Gleason        Date: 2022  : 1955   yes Patient  Verified  Visit #:  10  of   20  Insurance: Payor: Wild Manzanares / Plan: [place] MEDICARE PART A & B / Product Type: Medicare /      In time: 1000 Out time: 1055   Total Treatment Time: 55     Medicare/BCBS Time Tracking (below)   Total Timed Codes (min):  55 1:1 Treatment Time:  45     TREATMENT AREA =  Shoulder pain, right [M25.511]  Left shoulder pain [M25.512]  Adhesive capsulitis of right shoulder [M75.01]  Adhesive capsulitis of left shoulder [M75.02]    SUBJECTIVE  Pain Level (on 0 to 10 scale):  L3  / 10 R 1 /10   Medication Changes/New allergies or changes in medical history, any new surgeries or procedures?    no  If yes, update Summary List   Subjective Functional Status/Changes:  []  No changes reported     My left arm still seems to bother me more than my right. OBJECTIVE  Modalities Rationale:     decrease edema, decrease inflammation, and decrease pain to improve patient's ability to perform ADLs with decreased pain and improved mobility.     min [] Estim, type/location:                                                                 []  att     []  unatt     []  w/US     []  w/ice    []  w/heat     min []  Mechanical Traction: type/lbs                    []  pro   []  sup   []  int   []  cont    []  before manual    []  after manual     min []  Ultrasound, settings/location:        min []  Iontophoresis w/ dexamethasone, location:                                                []  take home patch       []  in clinic   10 min [x]  Ice     []  Heat    location/position: Incline (L) shoulder     min []  Vasopneumatic Device, press/temp:     If using vaso (only need to measure limb vaso being performed on)      pre-treatment girth :      post-treatment girth :      measured at (landmark location) :       min []  Other:     [] Skin assessment post-treatment (if applicable): []  intact    []  redness- no adverse reaction                  []redness - adverse reaction:      30 min Therapeutic Exercise:  [x]  See flow sheet  B shoulder stretching ABD and ER per tolerance    Rationale:      increase ROM, increase strength, improve coordination, improve balance, and increase proprioception to improve the patients ability to perform general ADLs with decrease c/o symptoms and with improved functional performance. min Manual Therapy: (B) UE: DTM UT/supra, CFm along biceps tenon region, PROM in all directions, grade 2 GH mob. Rationale:      decrease pain, increase ROM, increase tissue extensibility, decrease edema , and decrease trigger points to improve patient's ability to perform general ADLs with decrease c/o symptoms and with improved functional performance. The manual therapy interventions were performed at a separate and distinct time from the therapeutic activities interventions. 15 min Neuromuscular re-ed:  [x]  See flow sheet  B shoulder stretching ABD and ER per tolerance    Rationale:      increase ROM, increase strength, improve coordination, improve balance, and increase proprioception to improve the patients ability to perform general ADLs with decrease c/o symptoms and with improved functional performance. Billed With/As:   [x] TE   [] TA   [] Neuro   [] Self Care Patient Education: [x] Review HEP    [] Progressed/Changed HEP based on:   [] positioning   [] body mechanics   [] transfers   [] heat/ice application    [] other:      Other Objective/Functional Measures: Add bow and arrow with PTB 10 x 5 seconds, full lever arm shoulder flexion in supine 1#,  Prone scap squeeze. Post Treatment Pain Level (on 0 to 10) scale:   L 3 / 10 and R 1/10     ASSESSMENT  Assessment/Changes in Function:     Overall good tolerance to new therx.   Did report more difficulty with performance of therx on (L) UE vs (R) UE,       []  See Progress Note/Recertification Patient will continue to benefit from skilled PT services to modify and progress therapeutic interventions, address functional mobility deficits, address ROM deficits, address strength deficits, analyze and address soft tissue restrictions, analyze and cue movement patterns, analyze and modify body mechanics/ergonomics, assess and modify postural abnormalities, and instruct in home and community integration to attain remaining goals. Progress toward goals / Updated goals:    Goals for this certification period include and are to be achieved in  4 weeks   1) Pt will be able to lift >3 lbs overhead with </= 3/10 pain in order to lift plates overhead to put them in the cabinet with ease. 2) Pt will improve MMT to >/= 4+/5  for the shoulder demonstrated improved capacity for lifting heavier objects with outstretched arms. 3) Pt will be able to lift >20 lb from floor to waist without any shoulder pain improving capacity for lifting heavier objects.         PLAN  [x]  Upgrade activities as tolerated yes Continue plan of care   []  Discharge due to :    []  Other:      Therapist: Duane Pendleton PTA    Date: 8/23/2022 Time: 8:21 AM     Future Appointments   Date Time Provider Noy Garcia   8/23/2022 10:00 AM [facility] [address]   8/25/2022 10:00 AM [facility]   8/30/2022 10:00 AM [facility]   9/1/2022 10:45 AM [facility]   9/6/2022 11:30 AM [facility]   9/8/2022  9:15 AM [facility]   9/13/2022  9:45 AM [facility]   9/15/2022  9:15 AM [facility]   9/20/2022  9:15 AM Gretta Cross PTA [facility]   9/22/2022  9:15 AM Ranjit Fung, [facility] [address]

## 2022-08-25 ENCOUNTER — HOSPITAL ENCOUNTER (OUTPATIENT)
Dept: PHYSICAL THERAPY | Age: 67
Discharge: HOME | End: 2022-08-25
Payer: MEDICARE

## 2022-08-25 ENCOUNTER — APPOINTMENT (OUTPATIENT)
Dept: PHYSICAL THERAPY | Age: 67
End: 2022-08-25
Payer: MEDICARE

## 2022-08-25 PROCEDURE — 97110 THERAPEUTIC EXERCISES: CPT

## 2022-08-25 PROCEDURE — 97112 NEUROMUSCULAR REEDUCATION: CPT

## 2022-08-25 NOTE — PROGRESS NOTES
PHYSICAL THERAPY - DAILY TREATMENT NOTE    Patient Name: Sheri Davenport        Date: 2022  : 1955   yes Patient  Verified  Visit #:    Insurance: Payor: Leilani Hein / Plan: [place] MEDICARE PART A & B / Product Type: Medicare /      In time: 1000 Out time: 1050   Total Treatment Time: 50     Medicare/BCBS Time Tracking (below)   Total Timed Codes (min):  50 1:1 Treatment Time: 40     TREATMENT AREA =  Shoulder pain, right [M25.511]  Left shoulder pain [M25.512]  Adhesive capsulitis of right shoulder [M75.01]  Adhesive capsulitis of left shoulder [M75.02]    SUBJECTIVE  Pain Level (on 0 to 10 scale):  L2 / 10 R 0/10   Medication Changes/New allergies or changes in medical history, any new surgeries or procedures?    no  If yes, update Summary List   Subjective Functional Status/Changes:  []  No changes reported     There's usual something present in my shoulders but today it's feel really good. OBJECTIVE  Modalities Rationale:     decrease edema, decrease inflammation, and decrease pain to improve patient's ability to perform ADLs with decreased pain and improved mobility.     min [] Estim, type/location:                                                                 []  att     []  unatt     []  w/US     []  w/ice    []  w/heat     min []  Mechanical Traction: type/lbs                    []  pro   []  sup   []  int   []  cont    []  before manual    []  after manual     min []  Ultrasound, settings/location:        min []  Iontophoresis w/ dexamethasone, location:                                                []  take home patch       []  in clinic   10 min [x]  Ice     []  Heat    location/position: Incline (L) shoulder     min []  Vasopneumatic Device, press/temp:     If using vaso (only need to measure limb vaso being performed on)      pre-treatment girth :      post-treatment girth :      measured at (landmark location) :       min []  Other:     [] Skin assessment post-treatment (if applicable):    []  intact    []  redness- no adverse reaction                  []redness - adverse reaction:      30 min Therapeutic Exercise:  [x]  See flow sheet  B shoulder stretching ABD and ER per tolerance    Rationale:      increase ROM, increase strength, improve coordination, improve balance, and increase proprioception to improve the patients ability to perform general ADLs with decrease c/o symptoms and with improved functional performance. min Manual Therapy: (B) UE: DTM UT/supra, CFm along biceps tenon region, PROM in all directions, grade 2 GH mob. Rationale:      decrease pain, increase ROM, increase tissue extensibility, decrease edema , and decrease trigger points to improve patient's ability to perform general ADLs with decrease c/o symptoms and with improved functional performance. The manual therapy interventions were performed at a separate and distinct time from the therapeutic activities interventions. 10 min Neuromuscular re-ed:  [x]  See flow sheet  B shoulder stretching ABD and ER per tolerance    Rationale:      increase ROM, increase strength, improve coordination, improve balance, and increase proprioception to improve the patients ability to perform general ADLs with decrease c/o symptoms and with improved functional performance. Billed With/As:   [x] TE   [] TA   [] Neuro   [] Self Care Patient Education: [x] Review HEP    [] Progressed/Changed HEP based on:   [] positioning   [] body mechanics   [] transfers   [] heat/ice application    [] other:      Other Objective/Functional Measures:    Slow advancement of therx. Continue therx per flow sheet     Post Treatment Pain Level (on 0 to 10) scale:   L 4 / 10 and R 2/10     ASSESSMENT  Assessment/Changes in Function:     Slight increase of (B) shoulder pain at end of therx. No change in function noted at this time.        []  See Progress Note/Recertification   Patient will continue to benefit from skilled PT services to modify and progress therapeutic interventions, address functional mobility deficits, address ROM deficits, address strength deficits, analyze and address soft tissue restrictions, analyze and cue movement patterns, analyze and modify body mechanics/ergonomics, assess and modify postural abnormalities, and instruct in home and community integration to attain remaining goals. Progress toward goals / Updated goals:    Goals for this certification period include and are to be achieved in  4 weeks   1) Pt will be able to lift >3 lbs overhead with </= 3/10 pain in order to lift plates overhead to put them in the cabinet with ease. 2) Pt will improve MMT to >/= 4+/5  for the shoulder demonstrated improved capacity for lifting heavier objects with outstretched arms. 3) Pt will be able to lift >20 lb from floor to waist without any shoulder pain improving capacity for lifting heavier objects.         PLAN  [x]  Upgrade activities as tolerated yes Continue plan of care   []  Discharge due to :    []  Other:      Therapist: Jania Brewster PTA    Date: 8/25/2022 Time: 8:21 AM     Future Appointments   Date Time Provider Noy Garcia   8/25/2022 10:00 AM Seun [facility] [address]   8/30/2022 10:00 AM Prashant Domingo PTA [facility]   9/1/2022 10:45 AM Prashant Domingo PTA [facility]   9/6/2022 11:30 AM Prashant Domingo PTA [facility]   9/8/2022  9:15 AM Prashant Domingo PTA [facility]   9/13/2022  9:45 AM Prashant Domingo PTA [facility]   9/15/2022  9:15 AM Prashant Domingo PTA [facility]   9/20/2022  9:15 AM Prashant Domingo PTA [facility]   9/22/2022  9:15 AM Veronica Costa PTA [place] [id number]

## 2022-08-30 ENCOUNTER — HOSPITAL ENCOUNTER (OUTPATIENT)
Dept: PHYSICAL THERAPY | Age: 67
Discharge: HOME | End: 2022-08-30
Payer: MEDICARE

## 2022-08-30 PROCEDURE — 97110 THERAPEUTIC EXERCISES: CPT

## 2022-08-30 PROCEDURE — 97112 NEUROMUSCULAR REEDUCATION: CPT

## 2022-08-30 NOTE — PROGRESS NOTES
PHYSICAL THERAPY - DAILY TREATMENT NOTE    Patient Name: Binta Pabon        Date: 2022  : 1955   yes Patient  Verified  Visit #:    Insurance: Payor: Malka Álvarez / Plan: [place] MEDICARE PART A & B / Product Type: Medicare /      In time: 1000 Out time: 1055   Total Treatment Time: 55     Medicare/BCBS Time Tracking (below)   Total Timed Codes (min):  50 1:1 Treatment Time: 40     TREATMENT AREA =  Shoulder pain, right [M25.511]  Left shoulder pain [M25.512]  Adhesive capsulitis of right shoulder [M75.01]  Adhesive capsulitis of left shoulder [M75.02]    SUBJECTIVE  Pain Level (on 0 to 10 scale):  L 2 / 10 R 1/10   Medication Changes/New allergies or changes in medical history, any new surgeries or procedures?    no  If yes, update Summary List   Subjective Functional Status/Changes:  []  No changes reported     Did my usual stuff and my exercises. No problems with the shoulders. OBJECTIVE  Modalities Rationale:     decrease edema, decrease inflammation, and decrease pain to improve patient's ability to perform ADLs with decreased pain and improved mobility.     min [] Estim, type/location:                                                                 []  att     []  unatt     []  w/US     []  w/ice    []  w/heat     min []  Mechanical Traction: type/lbs                    []  pro   []  sup   []  int   []  cont    []  before manual    []  after manual     min []  Ultrasound, settings/location:        min []  Iontophoresis w/ dexamethasone, location:                                                []  take home patch       []  in clinic   10 min [x]  Ice     []  Heat    location/position: Incline (L) shoulder     min []  Vasopneumatic Device, press/temp:     If using vaso (only need to measure limb vaso being performed on)      pre-treatment girth :      post-treatment girth :      measured at (landmark location) :       min []  Other:     [] Skin assessment post-treatment (if applicable):    []  intact    []  redness- no adverse reaction                  []redness - adverse reaction:      30 min Therapeutic Exercise:  [x]  See flow sheet  B shoulder stretching ABD and ER per tolerance    Rationale:      increase ROM, increase strength, improve coordination, improve balance, and increase proprioception to improve the patients ability to perform general ADLs with decrease c/o symptoms and with improved functional performance. min Manual Therapy: (B) UE: DTM UT/supra, CFm along biceps tenon region, PROM in all directions, grade 2 GH mob. Rationale:      decrease pain, increase ROM, increase tissue extensibility, decrease edema , and decrease trigger points to improve patient's ability to perform general ADLs with decrease c/o symptoms and with improved functional performance. The manual therapy interventions were performed at a separate and distinct time from the therapeutic activities interventions. 15 min Neuromuscular re-ed:  [x]  See flow sheet  B shoulder stretching ABD and ER per tolerance    Rationale:      increase ROM, increase strength, improve coordination, improve balance, and increase proprioception to improve the patients ability to perform general ADLs with decrease c/o symptoms and with improved functional performance. Billed With/As:   [x] TE   [] TA   [] Neuro   [] Self Care Patient Education: [x] Review HEP    [] Progressed/Changed HEP based on:   [] positioning   [] body mechanics   [] transfers   [] heat/ice application    [] other:      Other Objective/Functional Measures:    Continue with therx for strengthening of (B) UEs. Post Treatment Pain Level (on 0 to 10) scale:   L 3 / 10 and R 2/10     ASSESSMENT  Assessment/Changes in Function:     Patient received new HEP which was reviewed.   PAtietn verbalized understanding and returned demonstration well.       []  See Progress Note/Recertification   Patient will continue to benefit from skilled PT services to modify and progress therapeutic interventions, address functional mobility deficits, address ROM deficits, address strength deficits, analyze and address soft tissue restrictions, analyze and cue movement patterns, analyze and modify body mechanics/ergonomics, assess and modify postural abnormalities, and instruct in home and community integration to attain remaining goals. Progress toward goals / Updated goals:    Goals for this certification period include and are to be achieved in  4 weeks   1) Pt will be able to lift >3 lbs overhead with </= 3/10 pain in order to lift plates overhead to put them in the cabinet with ease. 2) Pt will improve MMT to >/= 4+/5  for the shoulder demonstrated improved capacity for lifting heavier objects with outstretched arms. 3) Pt will be able to lift >20 lb from floor to waist without any shoulder pain improving capacity for lifting heavier objects.         PLAN  [x]  Upgrade activities as tolerated yes Continue plan of care   []  Discharge due to :    []  Other:      Therapist: Eusebia Nunez PTA    Date: 8/30/2022 Time: 8:21 AM     Future Appointments   Date Time Provider Noy Garcia   8/30/2022 10:00 AM Jim Vee [facility]   9/1/2022 10:45 AM Marlana Gowers, PTA [facility]   9/6/2022 11:30 AM Marlana Gowers, PTA [facility]   9/8/2022  9:15 AM Marlana Gowers, PTA [facility]   9/13/2022  9:45 AM Jenniferlana Gowers, PTA [facility]   9/15/2022  9:15 AM Marlana Gowers, PTA [facility]   9/20/2022  9:15 AM Marlana Gowers, PTA [facility]   9/22/2022  9:15 AM Elton Callahan [facility] [address]

## 2022-09-01 ENCOUNTER — HOSPITAL ENCOUNTER (OUTPATIENT)
Dept: PHYSICAL THERAPY | Age: 67
Discharge: HOME | End: 2022-09-01
Payer: MEDICARE

## 2022-09-01 PROCEDURE — 97535 SELF CARE MNGMENT TRAINING: CPT

## 2022-09-01 PROCEDURE — 97014 ELECTRIC STIMULATION THERAPY: CPT

## 2022-09-01 PROCEDURE — 97140 MANUAL THERAPY 1/> REGIONS: CPT

## 2022-09-01 NOTE — PROGRESS NOTES
PHYSICAL THERAPY - DAILY TREATMENT NOTE    Patient Name: Alexey Cadet        Date: 2022  : 1955   yes Patient  Verified  Visit #:  13of   20  Insurance: Payor: [place] MEDICARE / Plan: [place] MEDICARE PART A & B / Product Type: Medicare /      In time: 3425 Out time: 6978   Total Treatment Time: 60     Medicare/BCBS Time Tracking (below)   Total Timed Codes (min):  60 1:1 Treatment Time: 60     TREATMENT AREA =  Shoulder pain, right [M25.511]  Left shoulder pain [M25.512]  Adhesive capsulitis of right shoulder [M75.01]  Adhesive capsulitis of left shoulder [M75.02]    SUBJECTIVE  Pain Level (on 0 to 10 scale):  L 3 / 10, R 1/10   Medication Changes/New allergies or changes in medical history, any new surgeries or procedures?    no  If yes, update Summary List   Subjective Functional Status/Changes:  []  No changes reported     The discomfort in my(L) is becoming more constant. Feeling a lot of bone ache (AC/biceps tendon. OBJECTIVE  Modalities Rationale:     decrease edema, decrease inflammation, and decrease pain to improve patient's ability to perform ADLs with decreased pain and improved mobility.    10 min [x] Estim, type/location:                       (L) shoulder IFC                                          []  att     [x]  unatt     []  w/US     [x]  w/ice    []  w/heat     min []  Mechanical Traction: type/lbs                    []  pro   []  sup   []  int   []  cont    []  before manual    []  after manual     min []  Ultrasound, settings/location:        min []  Iontophoresis w/ dexamethasone, location:                                                []  take home patch       []  in clinic    min []  Ice     []  Heat    location/position:      min []  Vasopneumatic Device, press/temp:     If using vaso (only need to measure limb vaso being performed on)      pre-treatment girth :      post-treatment girth :      measured at (landmark location) :       min []  Other:     [] Skin assessment post-treatment (if applicable):    []  intact    []  redness- no adverse reaction                  []redness - adverse reaction:      25 min Self Care:  [x]  See flow sheet     Rationale:      increase ROM, increase strength, improve coordination, improve balance, and increase proprioception to improve the patients ability to perform general ADLs with decrease c/o symptoms and with improved functional performance. 25 min Manual Therapy: (L) UE: sTM UT/supra, CFm along biceps tendon region, PROM ER/IR, TPR infraspinatus   Rationale:      decrease pain, increase ROM, increase tissue extensibility, decrease edema , and decrease trigger points to improve patient's ability to perform general ADLs with decrease c/o symptoms and with improved functional performance. The manual therapy interventions were performed at a separate and distinct time from the therapeutic activities interventions. min Neuromuscular re-ed:  [x]  See flow sheet  B shoulder stretching ABD and ER per tolerance    Rationale:      increase ROM, increase strength, improve coordination, improve balance, and increase proprioception to improve the patients ability to perform general ADLs with decrease c/o symptoms and with improved functional performance. Billed With/As:   [x] TE   [] TA   [] Neuro   [] Self Care Patient Education: [x] Review HEP    [] Progressed/Changed HEP based on:   [] positioning   [] body mechanics   [] transfers   [] heat/ice application    [] other:      Other Objective/Functional Measures:    No therx performed today secondary to c/o increase pain and soreness of (L) shoulder     Post Treatment Pain Level (on 0 to 10) scale:   L 1 / 10 and R 1/10     ASSESSMENT  Assessment/Changes in Function:     Education in surgical intervention vs non surgical with pros/cons. Education instructural involvement inrelation to pain and motion of shoulder. Initiated estim with ice for pain management.        []  See Progress Note/Recertification   Patient will continue to benefit from skilled PT services to modify and progress therapeutic interventions, address functional mobility deficits, address ROM deficits, address strength deficits, analyze and address soft tissue restrictions, analyze and cue movement patterns, analyze and modify body mechanics/ergonomics, assess and modify postural abnormalities, and instruct in home and community integration to attain remaining goals. Progress toward goals / Updated goals:    Goals for this certification period include and are to be achieved in  4 weeks   1) Pt will be able to lift >3 lbs overhead with </= 3/10 pain in order to lift plates overhead to put them in the cabinet with ease. 2) Pt will improve MMT to >/= 4+/5  for the shoulder demonstrated improved capacity for lifting heavier objects with outstretched arms. 3) Pt will be able to lift >20 lb from floor to waist without any shoulder pain improving capacity for lifting heavier objects.         PLAN  [x]  Upgrade activities as tolerated yes Continue plan of care   []  Discharge due to :    []  Other:      Therapist: Denny Mccoy PTA    Date: 9/1/2022 Time: 8:21 AM     Future Appointments   Date Time Provider Noy Garcia   9/1/2022 10:45 AM Shanell Dowling  [facility]   9/6/2022 11:30 AM [facility]  [facility]   9/8/2022  9:15 AM [facility]   9/13/2022  9:45 AM [facility]   9/15/2022  9:15 AM Shanellede Dowling [facility] [address]   9/20/2022  9:15 AM [facility]   9/22/2022  9:15 AM Elvira Quesada [facility] [address]

## 2022-09-06 ENCOUNTER — HOSPITAL ENCOUNTER (OUTPATIENT)
Dept: PHYSICAL THERAPY | Age: 67
Discharge: HOME | End: 2022-09-06
Payer: MEDICARE

## 2022-09-06 PROCEDURE — 97014 ELECTRIC STIMULATION THERAPY: CPT

## 2022-09-06 PROCEDURE — 97140 MANUAL THERAPY 1/> REGIONS: CPT

## 2022-09-06 PROCEDURE — 97110 THERAPEUTIC EXERCISES: CPT

## 2022-09-06 NOTE — PROGRESS NOTES
PHYSICAL THERAPY - DAILY TREATMENT NOTE    Patient Name: Brittani Matute        Date: 2022  : 1955   yes Patient  Verified  Visit #:  15 of   20  Insurance: Payor: Devan Keep / Plan: [place] MEDICARE PART A & B / Product Type: Medicare /      In time: 1130 Out time: 1225   Total Treatment Time: 55     Medicare/BCBS Time Tracking (below)   Total Timed Codes (min): 55 1:1 Treatment Time:55     TREATMENT AREA =  Shoulder pain, right [M25.511]  Left shoulder pain [M25.512]  Adhesive capsulitis of right shoulder [M75.01]  Adhesive capsulitis of left shoulder [M75.02]    SUBJECTIVE  Pain Level (on 0 to 10 scale):  L 2/ 10, R 1/10   Medication Changes/New allergies or changes in medical history, any new surgeries or procedures?    no  If yes, update Summary List   Subjective Functional Status/Changes:  []  No changes reported     Denies any problems over this past weekend. Felt good after last session. Didn't have pain like before in my left shoulder. Did the exercises once over the weekend. OBJECTIVE  Modalities Rationale:     decrease edema, decrease inflammation, and decrease pain to improve patient's ability to perform ADLs with decreased pain and improved mobility.    10 min [x] Estim, type/location:                       (L) shoulder IFC                                          []  att     [x]  unatt     []  w/US     [x]  w/ice    []  w/heat     min []  Mechanical Traction: type/lbs                    []  pro   []  sup   []  int   []  cont    []  before manual    []  after manual     min []  Ultrasound, settings/location:        min []  Iontophoresis w/ dexamethasone, location:                                                []  take home patch       []  in clinic    min []  Ice     []  Heat    location/position:      min []  Vasopneumatic Device, press/temp:     If using vaso (only need to measure limb vaso being performed on)      pre-treatment girth :      post-treatment girth :      measured at (landmark location) :       min []  Other:     [] Skin assessment post-treatment (if applicable):    []  intact    []  redness- no adverse reaction                  []redness - adverse reaction:         min Self Care:  [x]  See flow sheet     Rationale:      increase ROM, increase strength, improve coordination, improve balance, and increase proprioception to improve the patients ability to perform general ADLs with decrease c/o symptoms and with improved functional performance. 25 min Manual Therapy: (L) UE: sTM UT/supra, CFm along biceps tendon region, PROM ER/IR, TPR infraspinatus   Rationale:      decrease pain, increase ROM, increase tissue extensibility, decrease edema , and decrease trigger points to improve patient's ability to perform general ADLs with decrease c/o symptoms and with improved functional performance. The manual therapy interventions were performed at a separate and distinct time from the therapeutic activities interventions. 20 min Therapeutic Exercise:  [x]  See flow sheet  B shoulder stretching ABD and ER per tolerance    Rationale:      increase ROM, increase strength, improve coordination, improve balance, and increase proprioception to improve the patients ability to perform general ADLs with decrease c/o symptoms and with improved functional performance. Billed With/As:   [x] TE   [] TA   [] Neuro   [] Self Care Patient Education: [x] Review HEP    [] Progressed/Changed HEP based on:   [] positioning   [] body mechanics   [] transfers   [] heat/ice application    [] other:      Other Objective/Functional Measures: Add light therx into routine today, slight increase of (L) shoulder pain noted. Increase TTP along origin of UT. Post Treatment Pain Level (on 0 to 10) scale:   L 3/ 10 and R 1/10     ASSESSMENT  Assessment/Changes in Function:     Continued pain noted with manaul resistance for MMT.  Unable to fully test (L)_.     []  See Progress Note/Recertification Patient will continue to benefit from skilled PT services to modify and progress therapeutic interventions, address functional mobility deficits, address ROM deficits, address strength deficits, analyze and address soft tissue restrictions, analyze and cue movement patterns, analyze and modify body mechanics/ergonomics, assess and modify postural abnormalities, and instruct in home and community integration to attain remaining goals. Progress toward goals / Updated goals:    Goals for this certification period include and are to be achieved in  4 weeks   1) Pt will be able to lift >3 lbs overhead with </= 3/10 pain in order to lift plates overhead to put them in the cabinet with ease. 2) Pt will improve MMT to >/= 4+/5  for the shoulder demonstrated improved capacity for lifting heavier objects with outstretched arms. 3) Pt will be able to lift >20 lb from floor to waist without any shoulder pain improving capacity for lifting heavier objects.         PLAN  [x]  Upgrade activities as tolerated yes Continue plan of care   []  Discharge due to :    []  Other:      Therapist: Huma Crolwey PTA    Date: 9/6/2022 Time: 8:21 AM     Future Appointments   Date Time Provider Noy Garcia   9/6/2022 11:30 AM Zada Money, PTA [facility]   9/8/2022  9:15 AM Zada Money, PTA [facility]   9/13/2022  9:45 AM Zada Money, PTA [facility]   9/15/2022  9:15 AM Zada Money, PTA [facility] [address]   9/20/2022  9:15 AM Zada Money, [facility]   9/22/2022  9:15 AM Henery Vanessa Gastelum, PTA [facility] [address]

## 2022-09-08 ENCOUNTER — HOSPITAL ENCOUNTER (OUTPATIENT)
Dept: PHYSICAL THERAPY | Age: 67
Discharge: HOME | End: 2022-09-08
Payer: MEDICARE

## 2022-09-08 PROCEDURE — 97110 THERAPEUTIC EXERCISES: CPT

## 2022-09-08 PROCEDURE — 97014 ELECTRIC STIMULATION THERAPY: CPT

## 2022-09-08 PROCEDURE — 97140 MANUAL THERAPY 1/> REGIONS: CPT

## 2022-09-08 NOTE — PROGRESS NOTES
PHYSICAL THERAPY - DAILY TREATMENT NOTE    Patient Name: Alexey Cadet        Date: 2022  : 1955   yes Patient  Verified  Visit #:  15 of   20  Insurance: Payor: Sheridan Rosas / Plan: [place] & [place] / Product Type: Medicare /      In time: 578 Out time:    Total Treatment Time: 60     Medicare/BCBS Time Tracking (below)   Total Timed Codes (min): 60 1:1 Treatment Time:  60     TREATMENT AREA =  Shoulder pain, right [M25.511]  Left shoulder pain [M25.512]  Adhesive capsulitis of right shoulder [M75.01]  Adhesive capsulitis of left shoulder [M75.02]    SUBJECTIVE  Pain Level (on 0 to 10 scale):  L 2/ 10, R 1/10   Medication Changes/New allergies or changes in medical history, any new surgeries or procedures?    no  If yes, update Summary List   Subjective Functional Status/Changes:  []  No changes reported     Reports sleeping on (L) side and woke up this morning feeling pretty good and no having an uncomfortable feeling in shoulder. Reynaldo Padilla leaving here on Tuesday I was hurting pretty bad the whole day. OBJECTIVE  Modalities Rationale:     decrease edema, decrease inflammation, and decrease pain to improve patient's ability to perform ADLs with decreased pain and improved mobility.    10 min [x] Estim, type/location:                       (L) shoulder IFC                                          []  att     [x]  unatt     []  w/US     [x]  w/ice    []  w/heat     min []  Mechanical Traction: type/lbs                    []  pro   []  sup   []  int   []  cont    []  before manual    []  after manual     min []  Ultrasound, settings/location:        min []  Iontophoresis w/ dexamethasone, location:                                                []  take home patch       []  in clinic    min []  Ice     []  Heat    location/position:      min []  Vasopneumatic Device, press/temp:     If using vaso (only need to measure limb vaso being performed on)      pre-treatment girth : post-treatment girth :      measured at (landmark location) :       min []  Other:     [] Skin assessment post-treatment (if applicable):    []  intact    []  redness- no adverse reaction                  []redness - adverse reaction:         min Self Care:  [x]  See flow sheet     Rationale:      increase ROM, increase strength, improve coordination, improve balance, and increase proprioception to improve the patients ability to perform general ADLs with decrease c/o symptoms and with improved functional performance. 25 min Manual Therapy: (L) UE: sTM UT/supra, CFm along biceps tendon region, PROM ER/IR, TPR infraspinatus   Rationale:      decrease pain, increase ROM, increase tissue extensibility, decrease edema , and decrease trigger points to improve patient's ability to perform general ADLs with decrease c/o symptoms and with improved functional performance. The manual therapy interventions were performed at a separate and distinct time from the therapeutic activities interventions. 20 min Therapeutic Exercise:  [x]  See flow sheet  B shoulder stretching ABD and ER per tolerance    Rationale:      increase ROM, increase strength, improve coordination, improve balance, and increase proprioception to improve the patients ability to perform general ADLs with decrease c/o symptoms and with improved functional performance. Billed With/As:   [x] TE   [] TA   [] Neuro   [] Self Care Patient Education: [x] Review HEP    [] Progressed/Changed HEP based on:   [] positioning   [] body mechanics   [] transfers   [] heat/ice application    [] other:      Other Objective/Functional Measures:    Shoulder flexion: (L) 155, (R) 146    FIR: (L) T2, (R) T2    NICKY: (L) T4, (R) T6    ER and IR strength with MMT 5/5.      Post Treatment Pain Level (on 0 to 10) scale:   L 2 10 and R 1/10     ASSESSMENT  Assessment/Changes in Function:     Patient is able to reach the top shelf in a cabinet without increase of (B) UE pain and improved quality of motion and strength. []  See Progress Note/Recertification   Patient will continue to benefit from skilled PT services to modify and progress therapeutic interventions, address functional mobility deficits, address ROM deficits, address strength deficits, analyze and address soft tissue restrictions, analyze and cue movement patterns, analyze and modify body mechanics/ergonomics, assess and modify postural abnormalities, and instruct in home and community integration to attain remaining goals. Progress toward goals / Updated goals:    Goals for this certification period include and are to be achieved in  4 weeks   1) Pt will be able to lift >3 lbs overhead with </= 3/10 pain in order to lift plates overhead to put them in the cabinet with ease. 2) Pt will improve MMT to >/= 4+/5  for the shoulder demonstrated improved capacity for lifting heavier objects with outstretched arms. 3) Pt will be able to lift >20 lb from floor to waist without any shoulder pain improving capacity for lifting heavier objects.         PLAN  [x]  Upgrade activities as tolerated yes Continue plan of care   []  Discharge due to :    []  Other:      Therapist: Huma Crowley PTA    Date: 9/8/2022 Time: 8:21 AM     Future Appointments   Date Time Provider Noy Garcia   9/8/2022  9:15 AM Zada Money, PTA [facility]   9/13/2022  9:45 AM Zada Money, PTA [address]   9/15/2022  9:15 AM Zada Money, PTA [address]   9/20/2022  9:15 AM Zada Money, PTA [address]   9/22/2022  9:15 AM Henery Vanessa Gastelum, PTA [address]

## 2022-09-13 ENCOUNTER — HOSPITAL ENCOUNTER (OUTPATIENT)
Dept: PHYSICAL THERAPY | Age: 67
Discharge: HOME | End: 2022-09-13
Payer: MEDICARE

## 2022-09-13 PROCEDURE — 97140 MANUAL THERAPY 1/> REGIONS: CPT

## 2022-09-13 PROCEDURE — 97110 THERAPEUTIC EXERCISES: CPT

## 2022-09-13 PROCEDURE — 97014 ELECTRIC STIMULATION THERAPY: CPT

## 2022-09-13 NOTE — PROGRESS NOTES
PHYSICAL THERAPY - DAILY TREATMENT NOTE    Patient Name: Cheri Mathews        Date: 2022  : 1955   yes Patient  Verified  Visit #:    Insurance: Payor: Karla Collazo / Plan: [place] MEDICARE PART A & B / Product Type: Medicare /      In time: 699 Out time: 1596   Total Treatment Time: 50     Medicare/BCBS Time Tracking (below)   Total Timed Codes (min): 50 1:1 Treatment Time: 50     TREATMENT AREA =  Shoulder pain, right [M25.511]  Left shoulder pain [M25.512]  Adhesive capsulitis of right shoulder [M75.01]  Adhesive capsulitis of left shoulder [M75.02]    SUBJECTIVE  Pain Level (on 0 to 10 scale):  L 2/ 10, R 1/10   Medication Changes/New allergies or changes in medical history, any new surgeries or procedures?    no  If yes, update Summary List   Subjective Functional Status/Changes:  []  No changes reported     Reports no complications with (B) shoulder jim this past weekend. Did not perform HEP over the weekend. Had a lot of movement with my arms though         OBJECTIVE  Modalities Rationale:     decrease edema, decrease inflammation, and decrease pain to improve patient's ability to perform ADLs with decreased pain and improved mobility.    10 min [x] Estim, type/location:                       (L) shoulder IFC                                          []  att     [x]  unatt     []  w/US     [x]  w/ice    []  w/heat     min []  Mechanical Traction: type/lbs                    []  pro   []  sup   []  int   []  cont    []  before manual    []  after manual     min []  Ultrasound, settings/location:        min []  Iontophoresis w/ dexamethasone, location:                                                []  take home patch       []  in clinic    min []  Ice     []  Heat    location/position:      min []  Vasopneumatic Device, press/temp:     If using vaso (only need to measure limb vaso being performed on)      pre-treatment girth :      post-treatment girth :      measured at (landmark location) :       min []  Other:     [] Skin assessment post-treatment (if applicable):    []  intact    []  redness- no adverse reaction                  []redness - adverse reaction:         min Self Care:  [x]  See flow sheet     Rationale:      increase ROM, increase strength, improve coordination, improve balance, and increase proprioception to improve the patients ability to perform general ADLs with decrease c/o symptoms and with improved functional performance. 15 min Manual Therapy: (L) UE: sTM UT/supra, CFm along biceps tendon region, PROM ER/IR, TPR infraspinatus   Rationale:      decrease pain, increase ROM, increase tissue extensibility, decrease edema , and decrease trigger points to improve patient's ability to perform general ADLs with decrease c/o symptoms and with improved functional performance. The manual therapy interventions were performed at a separate and distinct time from the therapeutic activities interventions. 15 min Therapeutic Exercise:  [x]  See flow sheet  B shoulder stretching ABD and ER per tolerance    Rationale:      increase ROM, increase strength, improve coordination, improve balance, and increase proprioception to improve the patients ability to perform general ADLs with decrease c/o symptoms and with improved functional performance. Billed With/As:   [x] TE   [] TA   [] Neuro   [] Self Care Patient Education: [] Review HEP    [] Progressed/Changed HEP based on:   [] positioning   [] body mechanics   [] transfers   [] heat/ice application    [] other:      Other Objective/Functional Measures:    Increase use of light resistance with therx. Post Treatment Pain Level (on 0 to 10) scale:   L 2 10 and R 1/10     ASSESSMENT  Assessment/Changes in Function:     Good tolerance to therx and increase of resistance. Slight increase of (L) shoulder pain (strong 2/10). Patient is maintaining good reduction of pain symptoms.      []  See Progress Note/Recertification   Patient will continue to benefit from skilled PT services to modify and progress therapeutic interventions, address functional mobility deficits, address ROM deficits, address strength deficits, analyze and address soft tissue restrictions, analyze and cue movement patterns, analyze and modify body mechanics/ergonomics, assess and modify postural abnormalities, and instruct in home and community integration to attain remaining goals. Progress toward goals / Updated goals:    Goals for this certification period include and are to be achieved in  4 weeks   1) Pt will be able to lift >3 lbs overhead with </= 3/10 pain in order to lift plates overhead to put them in the cabinet with ease. 2) Pt will improve MMT to >/= 4+/5  for the shoulder demonstrated improved capacity for lifting heavier objects with outstretched arms. 3) Pt will be able to lift >20 lb from floor to waist without any shoulder pain improving capacity for lifting heavier objects.         PLAN  [x]  Upgrade activities as tolerated yes Continue plan of care   []  Discharge due to :    []  Other:      Therapist: Magnus Grossman PTA    Date: 9/13/2022 Time: 8:21 AM     Future Appointments   Date Time Provider Noy Garcia   9/13/2022  9:45 AM Thersia Spindle,  [address] [facility]   9/15/2022  9:15 AM Thersia Spindle, PTA [address]   9/20/2022  9:15 AM Thersia Spindle,  [address] [facility]   9/22/2022  9:15 AM Bernardo Parikh Pain, PTA [address]

## 2022-09-15 ENCOUNTER — HOSPITAL ENCOUNTER (OUTPATIENT)
Dept: PHYSICAL THERAPY | Age: 67
Discharge: HOME | End: 2022-09-15
Payer: MEDICARE

## 2022-09-15 PROCEDURE — 97014 ELECTRIC STIMULATION THERAPY: CPT

## 2022-09-15 PROCEDURE — 97110 THERAPEUTIC EXERCISES: CPT

## 2022-09-15 PROCEDURE — 97535 SELF CARE MNGMENT TRAINING: CPT

## 2022-09-15 PROCEDURE — 97140 MANUAL THERAPY 1/> REGIONS: CPT

## 2022-09-15 NOTE — PROGRESS NOTES
PHYSICAL THERAPY - DAILY TREATMENT NOTE    Patient Name: Flaca Tello        Date: 9/15/2022  : 1955   yes Patient  Verified  Visit #:    Insurance: Payor: Charis Perkins / Plan: [place] MEDICARE PART A & B / Product Type: Medicare /      In time: 608 Out time: 1307   Total Treatment Time: 60     Medicare/BCBS Time Tracking (below)   Total Timed Codes (min): 60 1:1 Treatment Time: 60     TREATMENT AREA =  Shoulder pain, right [M25.511]  Left shoulder pain [M25.512]  Adhesive capsulitis of right shoulder [M75.01]  Adhesive capsulitis of left shoulder [M75.02]    SUBJECTIVE  Pain Level (on 0 to 10 scale):  L 2/ 10, R1/10   Medication Changes/New allergies or changes in medical history, any new surgeries or procedures?    no  If yes, update Summary List   Subjective Functional Status/Changes:  []  No changes reported     Patient states last visit with PA was a discussion about surgical intervention. OBJECTIVE  Modalities Rationale:     decrease edema, decrease inflammation, and decrease pain to improve patient's ability to perform ADLs with decreased pain and improved mobility.    10 min [x] Estim, type/location:                       (L) shoulder IFC                                          []  att     [x]  unatt     []  w/US     [x]  w/ice    []  w/heat     min []  Mechanical Traction: type/lbs                    []  pro   []  sup   []  int   []  cont    []  before manual    []  after manual     min []  Ultrasound, settings/location:        min []  Iontophoresis w/ dexamethasone, location:                                                []  take home patch       []  in clinic    min []  Ice     []  Heat    location/position:      min []  Vasopneumatic Device, press/temp:     If using vaso (only need to measure limb vaso being performed on)      pre-treatment girth :      post-treatment girth :      measured at (landmark location) :       min []  Other:     [x] Skin assessment post-treatment (if applicable):    [x]  intact    []  redness- no adverse reaction                  []redness - adverse reaction:        15 min Self Care:  [x]  See flow sheet  Education and discussion of present level of function, surgical intervention, lift and carry of objects     Rationale:      increase ROM, increase strength, improve coordination, improve balance, and increase proprioception to improve the patients ability to perform general ADLs with decrease c/o symptoms and with improved functional performance. 15 min Manual Therapy: (L) UE: sTM UT/supra, CFm along biceps tendon region, PROM ER/IR, TPR infraspinatus   Rationale:      decrease pain, increase ROM, increase tissue extensibility, decrease edema , and decrease trigger points to improve patient's ability to perform general ADLs with decrease c/o symptoms and with improved functional performance. The manual therapy interventions were performed at a separate and distinct time from the therapeutic activities interventions. 20 min Therapeutic Exercise:  [x]  See flow sheet  B shoulder stretching ABD and ER per tolerance    Rationale:      increase ROM, increase strength, improve coordination, improve balance, and increase proprioception to improve the patients ability to perform general ADLs with decrease c/o symptoms and with improved functional performance. Billed With/As:   [x] TE   [] TA   [] Neuro   [] Self Care Patient Education: [] Review HEP    [] Progressed/Changed HEP based on:   [] positioning   [] body mechanics   [] transfers   [] heat/ice application    [] other:      Other Objective/Functional Measures:    Noted slight reduction of (R) shoulder mobility with AROM vs (L) at this time secondary to patient request of more time with (L) shoulder. Post Treatment Pain Level (on 0 to 10) scale:   L 3/ 10 and R 1/10     ASSESSMENT  Assessment/Changes in Function:     Patient deomstrates the ability to lift and carry 16# box.     Discussed use of remaining visits to address (R) shoulder mobility which patient agreed with.     []  See Progress Note/Recertification   Patient will continue to benefit from skilled PT services to modify and progress therapeutic interventions, address functional mobility deficits, address ROM deficits, address strength deficits, analyze and address soft tissue restrictions, analyze and cue movement patterns, analyze and modify body mechanics/ergonomics, assess and modify postural abnormalities, and instruct in home and community integration to attain remaining goals. Progress toward goals / Updated goals:    Goals for this certification period include and are to be achieved in  4 weeks   1) Pt will be able to lift >3 lbs overhead with </= 3/10 pain in order to lift plates overhead to put them in the cabinet with ease. 2) Pt will improve MMT to >/= 4+/5  for the shoulder demonstrated improved capacity for lifting heavier objects with outstretched arms. 3) Pt will be able to lift >20 lb from floor to waist without any shoulder pain improving capacity for lifting heavier objects. Patient is able to lift/carry 16# : progressing 9/15/22    Anticipate DC in 2 more visits.      PLAN  [x]  Upgrade activities as tolerated yes Continue plan of care   []  Discharge due to :    []  Other:      Therapist: Aleja Corado PTA    Date: 9/15/2022 Time: 8:21 AM     Future Appointments   Date Time Provider Noy Garcia   9/15/2022  9:15 AM [facility] [address]   9/20/2022  9:15 AM Santos Ascencio PTA [address] [address]   9/22/2022  9:15 AM Jasiel Win PTA [address]

## 2022-09-20 ENCOUNTER — HOSPITAL ENCOUNTER (OUTPATIENT)
Dept: PHYSICAL THERAPY | Age: 67
Discharge: HOME | End: 2022-09-20
Payer: MEDICARE

## 2022-09-20 PROCEDURE — 97110 THERAPEUTIC EXERCISES: CPT

## 2022-09-20 PROCEDURE — 97112 NEUROMUSCULAR REEDUCATION: CPT

## 2022-09-20 NOTE — PROGRESS NOTES
PHYSICAL THERAPY - DAILY TREATMENT NOTE    Patient Name: Laci Alvarenga        Date: 2022  : 1955   yes Patient  Verified  Visit #:  15 of   20  Insurance: Payor: Lana Cunha / Plan: [place] MEDICARE PART A & B / Product Type: Medicare /      In time: 585 Out time: 1000   Total Treatment Time: 45     Medicare/BCBS Time Tracking (below)   Total Timed Codes (min):45 1:1 Treatment Time: 45     TREATMENT AREA =  Shoulder pain, right [M25.511]  Left shoulder pain [M25.512]  Adhesive capsulitis of right shoulder [M75.01]  Adhesive capsulitis of left shoulder [M75.02]    SUBJECTIVE  Pain Level (on 0 to 10 scale):  L 2/ 10, R1/10   Medication Changes/New allergies or changes in medical history, any new surgeries or procedures?    no  If yes, update Summary List   Subjective Functional Status/Changes:  []  No changes reported     No new c/o         OBJECTIVE  Modalities Rationale:     decrease edema, decrease inflammation, and decrease pain to improve patient's ability to perform ADLs with decreased pain and improved mobility.    nd min [x] Estim, type/location:                       (L) shoulder IFC                                          []  att     [x]  unatt     []  w/US     [x]  w/ice    []  w/heat     min []  Mechanical Traction: type/lbs                    []  pro   []  sup   []  int   []  cont    []  before manual    []  after manual     min []  Ultrasound, settings/location:        min []  Iontophoresis w/ dexamethasone, location:                                                []  take home patch       []  in clinic    min []  Ice     []  Heat    location/position:      min []  Vasopneumatic Device, press/temp:     If using vaso (only need to measure limb vaso being performed on)      pre-treatment girth :      post-treatment girth :      measured at (landmark location) :       min []  Other:     [x] Skin assessment post-treatment (if applicable):    [x]  intact    []  redness- no adverse reaction []redness - adverse reaction:        20 min Neuromuscular re-education:  [x]  See flow sheet  Education and discussion of present level of function, surgical intervention, lift and carry of objects     Rationale:      increase ROM, increase strength, improve coordination, improve balance, and increase proprioception to improve the patients ability to perform general ADLs with decrease c/o symptoms and with improved functional performance. nd min Manual Therapy: (L) UE: sTM UT/supra, CFm along biceps tendon region, PROM ER/IR, TPR infraspinatus   Rationale:      decrease pain, increase ROM, increase tissue extensibility, decrease edema , and decrease trigger points to improve patient's ability to perform general ADLs with decrease c/o symptoms and with improved functional performance. The manual therapy interventions were performed at a separate and distinct time from the therapeutic activities interventions. 25 min Therapeutic Exercise:  [x]  See flow sheet  B shoulder stretching ABD and ER per tolerance    Rationale:      increase ROM, increase strength, improve coordination, improve balance, and increase proprioception to improve the patients ability to perform general ADLs with decrease c/o symptoms and with improved functional performance. Billed With/As:   [x] TE   [] TA   [] Neuro   [] Self Care Patient Education: [] Review HEP    [] Progressed/Changed HEP based on:   [] positioning   [] body mechanics   [] transfers   [] heat/ice application    [] other:      Other Objective/Functional Measures: Add TB walk aways and supine scap stability. Post Treatment Pain Level (on 0 to 10) scale:   L 2/ 10 and R 1/10     ASSESSMENT  Assessment/Changes in Function:     Patient lifts 3# overhead without increase of shoulder pain of 2/10.      []  See Progress Note/Recertification   Patient will continue to benefit from skilled PT services to modify and progress therapeutic interventions, address functional mobility deficits, address ROM deficits, address strength deficits, analyze and address soft tissue restrictions, analyze and cue movement patterns, analyze and modify body mechanics/ergonomics, assess and modify postural abnormalities, and instruct in home and community integration to attain remaining goals. Progress toward goals / Updated goals:    Goals for this certification period include and are to be achieved in  4 weeks   1) Pt will be able to lift >3 lbs overhead with </= 3/10 pain in order to lift plates overhead to put them in the cabinet with ease. 2) Pt will improve MMT to >/= 4+/5  for the shoulder demonstrated improved capacity for lifting heavier objects with outstretched arms. 3) Pt will be able to lift >20 lb from floor to waist without any shoulder pain improving capacity for lifting heavier objects. Patient is able to lift/carry 16# : progressing 9/15/22    Anticipate DC in 1 more visits.      PLAN  [x]  Upgrade activities as tolerated yes Continue plan of care   []  Discharge due to :    []  Other:      Therapist: Laya Chavez PTA    Date: 9/20/2022 Time: 8:21 AM     Future Appointments   Date Time Provider Noy Garcia   9/20/2022  9:15 AM [address] [address]   9/22/2022  9:15 AM [facility] [address]

## 2022-09-22 ENCOUNTER — HOSPITAL ENCOUNTER (OUTPATIENT)
Dept: PHYSICAL THERAPY | Age: 67
Discharge: HOME | End: 2022-09-22
Payer: MEDICARE

## 2022-09-22 PROCEDURE — 97535 SELF CARE MNGMENT TRAINING: CPT

## 2022-09-22 PROCEDURE — 97110 THERAPEUTIC EXERCISES: CPT

## 2022-09-22 NOTE — THERAPY DISCHARGE
[address] [address],[facility], [facility] - Phone: [phone]  Fax: [phone] FOR PHYSICAL THERAPY          Patient Name: Dewey Weiner : 1955   Treatment/Medical Diagnosis: Shoulder pain, right [M25.511]  Left shoulder pain [M25.512]  Adhesive capsulitis of right shoulder [M75.01]  Adhesive capsulitis of left shoulder [M75.02]   Onset Date: 2022    [facility]   Prior [facility]ization: See Medical History Provider #: [id number]   Prior Level of Function: IND with all ADLs but does report prior bout of L arm pain in  which might have been similar   Comorbidities: Arthritis   Medications: Verified on Patient Summary List   Visits from [facility]: 19 Missed Visits: 0     Goal/Measure of Progress Goal Met? 1. Pt will be able to lift >3 lbs overhead with </= 3/10 pain in order to lift plates overhead to put them in the cabinet with ease. Status at last Eval:  Current Status: Patient lifts 3# overhead without increase of shoulder pain of 2/10. yes   2. Pt will improve MMT to >/= 4+/5  for the shoulder demonstrated improved capacity for lifting heavier objects with outstretched arms. Status at last Eval: reduced Current Status: Overall strength of (B) WFL. yes   3. Pt will be able to lift >20 lb from floor to waist without any shoulder pain improving capacity for lifting heavier objects. Status at last Eval: unable Current Status: Patient deomstrates the ability to lift and carry 16# box. progressing     Key Functional Changes/Progress: Patient reports ~90% overall improvement with performance of all functional mobility activities and performance of general ADLs,  Patient reports ~90% overall improvement with reduction of symptoms. FOTO: 60 vs 43 on IE: 17 point total improvement.   GROC: +7 a very great deal better. Assessments/Recommendations: Discontinue therapy. Progressing towards or have reached established goals. Specifics: Program completed    If you have any questions/comments please contact us directly at [phone]. Thank you for allowing us to assist in the care of your patient.     Therapist Signature: AMENA Sevilla/ Date: 9/22/2022   Reporting Period: 8/17/22 - 9/22/22 Time: 3:39 PM

## 2022-09-22 NOTE — PROGRESS NOTES
PHYSICAL THERAPY - DAILY TREATMENT NOTE    Patient Name: Claire Davenport        Date: 2022  : 1955   yes Patient  Verified  Visit #:    Insurance: Payor: Jennifer Gonzalez / Plan: [place] MED[place] PART A & B / Product Type: Medicare /      In time: 791 Out time: 1000   Total Treatment Time: 45     Medicare/BCBS Time Tracking (below)   Total Timed Codes (min):45 1:1 Treatment Time: 45     TREATMENT AREA =  Shoulder pain, right [M25.511]  Left shoulder pain [M25.512]  Adhesive capsulitis of right shoulder [M75.01]  Adhesive capsulitis of left shoulder [M75.02]    SUBJECTIVE  Pain Level (on 0 to 10 scale):  L2/ 10, R1/10   Medication Changes/New allergies or changes in medical history, any new surgeries or procedures?    no  If yes, update Summary List   Subjective Functional Status/Changes:  []  No changes reported     No new c/o         OBJECTIVE      20 min Self Care:  [x]  See flow sheet  Education and discussion of present level of function, surgical intervention, lift and carry of objects     Rationale:      increase ROM, increase strength, improve coordination, improve balance, and increase proprioception to improve the patients ability to perform general ADLs with decrease c/o symptoms and with improved functional performance. 25 min Therapeutic Exercise:  [x]  See flow sheet  B shoulder stretching ABD and ER per tolerance    Rationale:      increase ROM, increase strength, improve coordination, improve balance, and increase proprioception to improve the patients ability to perform general ADLs with decrease c/o symptoms and with improved functional performance. Billed With/As:   [x] TE   [] TA   [] Neuro   [] Self Care Patient Education: [] Review HEP    [] Progressed/Changed HEP based on:   [] positioning   [] body mechanics   [] transfers   [] heat/ice application    [] other:      Other Objective/Functional Measures: FOTO: 60     GROC : +7 a very great deal better.      Post Treatment Pain Level (on 0 to 10) scale:   L 2/ 10 and R 1/10     ASSESSMENT  Assessment/Changes in Function:     Overall strength of (B) WFL. Patient reports ~90% overall improvement with performance of all functional mobility activities and performance of general ADLs,  Patient reports ~90% overall improvement with reduction of symptoms     []  See Progress Note/Recertification      Progress toward goals / Updated goals:    Goals for this certification period include and are to be achieved in  4 weeks   1) Pt will be able to lift >3 lbs overhead with </= 3/10 pain in order to lift plates overhead to put them in the cabinet with ease. Achieved 9/22/22  2) Pt will improve MMT to >/= 4+/5  for the shoulder demonstrated improved capacity for lifting heavier objects with outstretched arms. Achieved 9/22/22  3) Pt will be able to lift >20 lb from floor to waist without any shoulder pain improving capacity for lifting heavier objects.  Patient is able to lift/carry 16# : progressing 9/15/22         PLAN  []  Upgrade activities as tolerated no Continue plan of care   [x]  Discharge due to : Program completed   []  Other:      Therapist: Jameson Schwab, PTA    Date: 9/22/2022 Time: 8:21 AM     Future Appointments   Date Time Provider Noy Garcia   9/22/2022  9:15 AM Royce Mcdonald PTA [id number]

## 2023-04-04 ENCOUNTER — HOSPITAL ENCOUNTER (OUTPATIENT)
Facility: HOSPITAL | Age: 68
Setting detail: RECURRING SERIES
Discharge: HOME | End: 2023-04-07
Payer: MEDICARE

## 2023-04-04 PROCEDURE — 97535 SELF CARE MNGMENT TRAINING: CPT

## 2023-04-04 PROCEDURE — 97140 MANUAL THERAPY 1/> REGIONS: CPT

## 2023-04-04 PROCEDURE — 97162 PT EVAL MOD COMPLEX 30 MIN: CPT

## 2023-04-04 NOTE — PROGRESS NOTES
or 1:1 Min (if diff from Tx Min) Procedure, Rationale, Specifics   30  70193 Manual Therapy (timed):  increase ROM, increase tissue extensibility, and increase postural awareness to improve patient's ability to progress to PLOF and address remaining functional goals. The manual therapy interventions were performed at a separate and distinct time from the therapeutic activities interventions . (see flow sheet as applicable)     Details if applicable:     15  72809 Self Care/Home Management (timed):  improve patient knowledge and understanding of pain reducing techniques, positioning, posture/ergonomics, home safety, activity modification, diagnosis/prognosis, and physical therapy expectations, procedures and progression  to improve patient's ability to progress to PLOF and address remaining functional goals. (see flow sheet as applicable)     Details if applicable:     39  [facility] Totals Reminder: bill using total billable min of TIMED therapeutic procedures (example: do not include dry needle or estim unattended, both untimed codes, in totals to left)  8-22 min = 1 unit; 23-37 min = 2 units; 38-52 min = 3 units; 53-67 min = 4 units; 68-82 min = 5 units   Total Total     [x]  Patient Education billed concurrently with other procedures   [x] Review HEP    [] Progressed/Changed HEP, detail:    [] Other detail:       Objective Information/Functional Measures/Assessment    See POC. Patient will continue to benefit from skilled PT / OT services to modify and progress therapeutic interventions, analyze and address functional mobility deficits, analyze and address ROM deficits, analyze and address strength deficits, analyze and address soft tissue restrictions, analyze and cue for proper movement patterns, analyze and modify for postural abnormalities, analyze and address imbalance/dizziness, and instruct in home and community integration to address functional deficits and attain remaining goals.     Progress toward goals

## 2023-04-04 NOTE — THERAPY EVALUATION
[address] [place] [address], [address] [place] [address] YH:[phone] Fx: [phone]  Plan of Care / Statement of Necessity for Physical Therapy Services     Patient Name: Diana Miles : 1955   Medical   Diagnosis: B Shoulder Pain Treatment Diagnosis:  M25.511  RIGHT SHOULDER PAIN and M25.512  LEFT SHOULDER PAIN    Onset Date: 1 year ago     Referral Source: NGUYỄN Lovelace * Start of Care [facility] [place]): 2023   Prior Hospitalization: See medical history Provider #: [id number]   Prior Level of Function: Ind and less pain for ADLs and activities of leisure. H(x) of adhesive capsulitis last year. Comorbidities: Cholesterol. Thyroid Problems   Medications: Verified on Patient Summary List     Assessment / key information:    Patient is a 69year old female. Primary complaints of B shoulder pain that began on a year ago due to partial tears in B shoulders L>R. Underwent injections in both shoulders and then underwent PT previously with Per Gaines to address frozen shoulder. Patient believes she is starting to get frozen shoulder again. Locates pain at left shoulder and right shoulder deltoid and into biceps tendon and RC insertion with the left being more intense. Rates pain as 0-6/10 from best to worst. Pain today is rated as 2 (R) and 4 (L)/10. Sleeping is interrupted due to pain, 6-7 hours, sleeps on back now due to pain, can lay on R shoulder some. Describes pain as a vice  and increased pressure, very tight and restricted, and aching/throbbing and constant in nature. Massaging, Aspercreme, aleve/ibuprofen PRN, ice makes the pain better. Reaching above shoulder height makes the pain worse. Patient is retired. Patient enjoys taking part in cooking, cleaning, and shopping. Their primary goals in PT are to reduce pain in order to take part in these activities of leisure. Patient recently saw their referring provider who prescribed OPPT.  They are

## 2023-04-14 ENCOUNTER — HOSPITAL ENCOUNTER (OUTPATIENT)
Facility: HOSPITAL | Age: 68
Setting detail: RECURRING SERIES
Discharge: HOME | End: 2023-04-17
Payer: MEDICARE

## 2023-04-14 PROCEDURE — 97110 THERAPEUTIC EXERCISES: CPT

## 2023-04-14 PROCEDURE — G0283 ELEC STIM OTHER THAN WOUND: HCPCS

## 2023-04-14 PROCEDURE — 97140 MANUAL THERAPY 1/> REGIONS: CPT

## 2023-04-20 ENCOUNTER — APPOINTMENT (OUTPATIENT)
Facility: HOSPITAL | Age: 68
End: 2023-04-20
Payer: MEDICARE

## 2023-04-20 ENCOUNTER — HOSPITAL ENCOUNTER (OUTPATIENT)
Facility: HOSPITAL | Age: 68
Setting detail: RECURRING SERIES
Discharge: HOME | End: 2023-04-23
Payer: MEDICARE

## 2023-04-20 PROCEDURE — G0283 ELEC STIM OTHER THAN WOUND: HCPCS

## 2023-04-20 PROCEDURE — 97110 THERAPEUTIC EXERCISES: CPT

## 2023-04-20 PROCEDURE — 97140 MANUAL THERAPY 1/> REGIONS: CPT

## 2023-04-25 ENCOUNTER — HOSPITAL ENCOUNTER (OUTPATIENT)
Facility: HOSPITAL | Age: 68
Setting detail: RECURRING SERIES
Discharge: HOME | End: 2023-04-28
Payer: MEDICARE

## 2023-04-25 ENCOUNTER — APPOINTMENT (OUTPATIENT)
Facility: HOSPITAL | Age: 68
End: 2023-04-25
Payer: MEDICARE

## 2023-04-25 PROCEDURE — G0283 ELEC STIM OTHER THAN WOUND: HCPCS

## 2023-04-25 PROCEDURE — 97535 SELF CARE MNGMENT TRAINING: CPT

## 2023-04-25 PROCEDURE — 97140 MANUAL THERAPY 1/> REGIONS: CPT

## 2023-04-25 NOTE — PROGRESS NOTES
left)  8-22 min = 1 unit; 23-37 min = 2 units; 38-52 min = 3 units; 53-67 min = 4 units; 68-82 min = 5 units   Total Total     [x]  Patient Education billed concurrently with other procedures   [x] Review HEP    [] Progressed/Changed HEP, detail:    [] Other detail:       Objective Information/Functional Measures/Assessment    Patient presenting with significant increase of neck and (B) shoulder pain reporting difficulty with sleeping. No known cause for pain. Discussed course of therapy in relationship to pain and function of shoulder. Advised patient to return to [place] for further consultation and possible use of cortisone injection for pain. No therx performed today secondary to increase of pain in shoulder with AROM. Good relief of neck symptoms post therapy. Patient will continue to benefit from skilled PT / OT services to modify and progress therapeutic interventions, analyze and address functional mobility deficits, analyze and address ROM deficits, analyze and address strength deficits, analyze and address soft tissue restrictions, analyze and cue for proper movement patterns, and analyze and modify for postural abnormalities to address functional deficits and attain remaining goals. Progress toward goals / Updated goals:  []  See Progress Note/Recertification    Short Term Goals: To be accomplished in 3 weeks  Patient will show compliance with regular HEP performance as prescribed by PT. Patient will reduce max pain to 4/10 in order to perform iADLs and ADLs more comfortably. Slowly progressing 4/20/23  Patient will improve shoulder strength to 3/5 on Left and 4-/5 on R for prolonged periods of sitting and object manipulation. Difficult with progression secondary to pain on (L) shoulder: 4/20/23  Patient will improve left shoulder abduction ROM to 145 degrees for improved functional mobility. Difficult with progression secondary to pain 4/20/23     Long Term Goals:  To be accomplished in 6

## 2023-04-27 ENCOUNTER — HOSPITAL ENCOUNTER (OUTPATIENT)
Facility: HOSPITAL | Age: 68
Setting detail: RECURRING SERIES
Discharge: HOME | End: 2023-04-30
Payer: MEDICARE

## 2023-04-27 ENCOUNTER — APPOINTMENT (OUTPATIENT)
Facility: HOSPITAL | Age: 68
End: 2023-04-27
Payer: MEDICARE

## 2023-04-27 PROCEDURE — 97110 THERAPEUTIC EXERCISES: CPT

## 2023-04-27 PROCEDURE — G0283 ELEC STIM OTHER THAN WOUND: HCPCS

## 2023-04-27 PROCEDURE — 97535 SELF CARE MNGMENT TRAINING: CPT

## 2023-04-27 PROCEDURE — 97140 MANUAL THERAPY 1/> REGIONS: CPT

## 2023-05-02 ENCOUNTER — APPOINTMENT (OUTPATIENT)
Facility: HOSPITAL | Age: 68
End: 2023-05-02
Payer: MEDICARE

## 2023-05-02 ENCOUNTER — HOSPITAL ENCOUNTER (OUTPATIENT)
Facility: HOSPITAL | Age: 68
Setting detail: RECURRING SERIES
Discharge: HOME | End: 2023-05-05
Payer: MEDICARE

## 2023-05-02 PROCEDURE — 97535 SELF CARE MNGMENT TRAINING: CPT

## 2023-05-02 PROCEDURE — 97140 MANUAL THERAPY 1/> REGIONS: CPT

## 2023-05-02 PROCEDURE — G0283 ELEC STIM OTHER THAN WOUND: HCPCS

## 2023-05-02 NOTE — THERAPY RECERTIFICATION
[address] [place] [address] PHYSICAL THERAPY  [address] [facility], [address], [place], [address] [place] [address] Ph: [phone] Fx: [phone]  CONTINUED PLAN OF CARE/RECERTIFICATION FOR PHYSICAL THERAPY          Patient Name: Sariah Reese : 1955   Treatment/Medical Diagnosis: Pain in right shoulder [M25.511]  Pain in left shoulder [M25.512]   Onset Date: 1 year ago    Referral Source: Beryle Budds, APRN * Start of Care [facility]): 2023   Prior Hospitalization: See Medical History Provider #: [id number]   Prior Level of Function: Ind and less pain for ADLs and activities of leisure. H(x) of adhesive capsulitis last year. Comorbidities: Cholesterol. Thyroid Problems   Medications: Verified on Patient Summary List   Visits from [facility]: 8 Missed Visits: 0     Progress to Goals:  Patient will reduce max pain to 2/10 in order to perform cooking and cleaning. Status at last Eval: 2 (R) and 4 (L)/10. Current Status: Patient reports average pain 3-4/10  Goal Met?   progressing    2. Patient will improve shoulder strength to 4+ to 5-/5 in order to return to cooking a full meal and cleaning home. Status at last Eval:                                         Strength (1-5)    Left  Right   Shoulder flexion 2 2   Abduction 3+ 4   IR 2 4   ER 2 3+     Current Status: Shoulder strength to 2/5 on L (increase pain with MMT)and 3 - 4-/5 on R (increase pain with MMT). All strength is globally based. Goal Met? Slow progression    3. Patient will improve left shoulder abduction ROM to 160 degrees for improved functional reaching. Status at last Eval:                    AROM                     PROM                   Strength (1-5)    Left Right Left Right   Shoulder flexion 170 deg pain with eccentric 175 deg pain with eccentric 170 deg 175 deg   Abduction 120 deg pain with raising and eccentric  155 deg pain with raising 110 deg 160 deg     Current Status: Left shoulder abduction ROM to 130 degrees  Goal Met?

## 2023-05-02 NOTE — PROGRESS NOTES
abduction ROM to 145 degrees for improved functional mobility. Difficult with progression secondary to pain 4/27/23     Long Term Goals: To be accomplished in 6 weeks  Patient will reduce max pain to 2/10 in order to perform cooking and cleaning. Patient will improve shoulder strength to 4+ to 5-/5 in order to return to cooking a full meal and cleaning home. Patient will improve left shoulder abduction ROM to 160 degrees for improved functional reaching. Patient will improve FOTO score to 64 for improved functional mobility and to return to [facility].      PLAN  Yes  Continue plan of care  [x]  Upgrade activities as tolerated  []  Discharge due to :  []  Other:    Dev Ramirez PTA    5/2/2023    8:47 AM    Future Appointments   Date Time Provider Rosa Valdes   5/2/2023  9:40 AM Dev Ramirez PTA [facility]   5/5/2023 10:20 AM Dev Ramirez PTA [facility]   5/9/2023 10:20 AM Lorraine Howard PT [facility]   5/11/2023 11:00 AM Dev Ramirez PTA [facility]

## 2023-05-05 ENCOUNTER — HOSPITAL ENCOUNTER (OUTPATIENT)
Facility: HOSPITAL | Age: 68
Setting detail: RECURRING SERIES
Discharge: HOME | End: 2023-05-08
Payer: MEDICARE

## 2023-05-05 PROCEDURE — 97140 MANUAL THERAPY 1/> REGIONS: CPT

## 2023-05-05 PROCEDURE — G0283 ELEC STIM OTHER THAN WOUND: HCPCS

## 2023-05-05 PROCEDURE — 97110 THERAPEUTIC EXERCISES: CPT

## 2023-05-09 ENCOUNTER — HOSPITAL ENCOUNTER (OUTPATIENT)
Facility: HOSPITAL | Age: 68
Setting detail: RECURRING SERIES
Discharge: HOME | End: 2023-05-12
Payer: MEDICARE

## 2023-05-09 PROCEDURE — G0283 ELEC STIM OTHER THAN WOUND: HCPCS

## 2023-05-09 PROCEDURE — 97110 THERAPEUTIC EXERCISES: CPT

## 2023-05-09 PROCEDURE — 97112 NEUROMUSCULAR REEDUCATION: CPT

## 2023-05-09 NOTE — PROGRESS NOTES
PHYSICAL / OCCUPATIONAL THERAPY - DAILY TREATMENT NOTE (updated )    Patient Name: Mitzi Tobar    Date: 2023    : 1955  Insurance: Payor: MEDICARE / Plan: MEDICARE PART A AND B / Product Type: *No Product type* /      Patient  verified Yes     Visit #   Current / Total 10 18   Time   In / Out 1020 1110   Pain   In / Out R 1  L 2 R 2  L 3   Subjective Functional Status/Changes: I changed my sheets on the bed before therapy, that was not a carly. Changes to:  Meds, Allergies, Med Hx, Sx Hx? If yes, update Summary List no       TREATMENT AREA =  Pain in right shoulder [M25.511]  Pain in left shoulder [M25.512]    OBJECTIVE    Modalities Rationale:     decrease inflammation, decrease pain, and increase tissue extensibility to improve patient's ability to progress to PLOF and address remaining functional goals. 10 min [x] Estim Unattended, type/location:  premod(B) shoulder                                    [x]  w/ice    []  w/heat    min [] Estim Attended, type/location:                                     []  w/US     []  w/ice    []  w/heat    []  TENS insruct      min []  Mechanical Traction: type/lbs                   []  pro   []  sup   []  int   []  cont    []  before manual    []  after manual    min []  Ultrasound, settings/location:      min []  Iontophoresis w/ dexamethasone, location:                                               []  take home patch       []  in clinic    min  unbill []  Ice     []  Heat    location/position:     min []  Paraffin,  details:     min []  Vasopneumatic Device, press/temp:     min []  Ciara Allen / Lissette Glad:     If using vaso (only need to measure limb vaso being performed on)      pre-treatment girth :       post-treatment girth :       measured at (landmark location) :      min []  Other:    Skin assessment post-treatment (if applicable):    []  intact    []  redness- no adverse reaction                 []redness - adverse reaction:         Therapeutic

## 2023-05-11 ENCOUNTER — HOSPITAL ENCOUNTER (OUTPATIENT)
Facility: HOSPITAL | Age: 68
Setting detail: RECURRING SERIES
Discharge: HOME | End: 2023-05-14
Payer: MEDICARE

## 2023-05-11 PROCEDURE — 97140 MANUAL THERAPY 1/> REGIONS: CPT

## 2023-05-11 PROCEDURE — 97110 THERAPEUTIC EXERCISES: CPT

## 2023-05-11 PROCEDURE — 97112 NEUROMUSCULAR REEDUCATION: CPT

## 2023-05-16 ENCOUNTER — HOSPITAL ENCOUNTER (OUTPATIENT)
Facility: HOSPITAL | Age: 68
Setting detail: RECURRING SERIES
Discharge: HOME | End: 2023-05-19
Payer: MEDICARE

## 2023-05-16 PROCEDURE — 97140 MANUAL THERAPY 1/> REGIONS: CPT

## 2023-05-16 PROCEDURE — 97110 THERAPEUTIC EXERCISES: CPT

## 2023-05-16 PROCEDURE — 97112 NEUROMUSCULAR REEDUCATION: CPT

## 2023-05-16 NOTE — PROGRESS NOTES
TIMED therapeutic procedures (example: do not include dry needle or estim unattended, both untimed codes, in totals to left)  8-22 min = 1 unit; 23-37 min = 2 units; 38-52 min = 3 units; 53-67 min = 4 units; 68-82 min = 5 units   Total Total     [x]  Patient Education billed concurrently with other procedures   [x] Review HEP    [] Progressed/Changed HEP, detail:    [] Other detail:       Objective Information/Functional Measures/Assessment    Difficulty performance of therx secondary to increase pain with available ROM. Patient to receive hydrodilatation this coming Friday. Patient will continue to benefit from skilled PT / OT services to modify and progress therapeutic interventions, analyze and address functional mobility deficits, analyze and address ROM deficits, analyze and address strength deficits, analyze and address soft tissue restrictions, analyze and cue for proper movement patterns, and analyze and modify for postural abnormalities to address functional deficits and attain remaining goals. Progress toward goals / Updated goals:  []  See Progress Note/Recertification    Goals for this certification period include and are to be achieved in  6-8 weeks:  Patient will improve FOTO score to 64 for improved functional mobility and to return to PLOF. Patient to sit 30 min reporting </= 2/10 pain. Slow progression 5/16/23  Patient will improve left shoulder abduction ROM to 150 degrees for improved functional reaching and ease of reaching into upper cabinets and shelves.  Slow progression 5/16/23    PLAN  Yes  Continue plan of care  [x]  Upgrade activities as tolerated  []  Discharge due to :  []  Other:    Dulce Maria Reynolds PTA    5/16/2023    8:51 AM    Future Appointments   Date Time Provider Rosa Valdes   5/16/2023 10:20 AM Dulce Maria Reynolds PTA [facility] [place] [facility]   5/18/2023 11:00 AM Kenton Sarmiento PT [facility]   5/23/2023 10:20 AM Dulce Maria Reynolds PTA [facility]   5/25/2023  9:40 AM Deepti Woody

## 2023-05-18 ENCOUNTER — APPOINTMENT (OUTPATIENT)
Facility: HOSPITAL | Age: 68
End: 2023-05-18
Payer: MEDICARE

## 2023-05-23 ENCOUNTER — APPOINTMENT (OUTPATIENT)
Facility: HOSPITAL | Age: 68
End: 2023-05-23
Payer: MEDICARE

## 2023-05-25 ENCOUNTER — APPOINTMENT (OUTPATIENT)
Facility: HOSPITAL | Age: 68
End: 2023-05-25
Payer: MEDICARE

## 2023-05-31 ENCOUNTER — APPOINTMENT (OUTPATIENT)
Facility: HOSPITAL | Age: 68
End: 2023-05-31
Payer: MEDICARE

## 2023-06-20 ENCOUNTER — HOSPITAL ENCOUNTER (OUTPATIENT)
Facility: HOSPITAL | Age: 68
Setting detail: RECURRING SERIES
Discharge: HOME | End: 2023-06-23
Payer: MEDICARE

## 2023-06-20 PROCEDURE — G0283 ELEC STIM OTHER THAN WOUND: HCPCS

## 2023-06-20 PROCEDURE — 97112 NEUROMUSCULAR REEDUCATION: CPT

## 2023-06-20 PROCEDURE — 97110 THERAPEUTIC EXERCISES: CPT

## 2023-06-20 PROCEDURE — 97140 MANUAL THERAPY 1/> REGIONS: CPT

## 2023-06-20 NOTE — PROGRESS NOTES
reaction                 []redness - adverse reaction:         Therapeutic Procedures: Tx Min Billable or 1:1 Min (if diff from Tx Min) Procedure, Rationale, Specifics   15 15 35603 Manual Therapy (timed):  decrease pain, increase ROM, and increase tissue extensibility to improve patient's ability to progress to PLOF and address remaining functional goals. The manual therapy interventions were performed at a separate and distinct time from the therapeutic activities interventions . (see flow sheet as applicable)     Details if applicable:  (L) UE STM, teres minor release, and PROM. 20 20 97110 Therapeutic Exercise (timed):  increase ROM, strength, coordination, balance, and proprioception to improve patient's ability to progress to PLOF and address remaining functional goals. (see flow sheet as applicable)     Details if applicable:       42127 Therapeutic Activity (timed):  use of dynamic activities replicating functional movements to increase ROM, strength, coordination, balance, and proprioception in order to improve patient's ability to progress to PLOF and address remaining functional goals. (see flow sheet as applicable)     Details if applicable:     15 15 43634 Neuromuscular Re-Education (timed):  improve balance, coordination, kinesthetic sense, posture, core stability and proprioception to improve patient's ability to develop conscious control of individual muscles and awareness of position of extremities in order to progress to PLOF and address remaining functional goals. (see flow sheet as applicable)     Details if applicable:       11129 Self Care/Home Management (timed):  improve patient knowledge and understanding of pain reducing techniques, positioning, and activity modification  to improve patient's ability to progress to PLOF and address remaining functional goals.   (see flow sheet as applicable)     Details if applicable:     61 60 [facility] Totals Reminder: bill using total billable min of

## 2023-06-23 ENCOUNTER — HOSPITAL ENCOUNTER (OUTPATIENT)
Facility: HOSPITAL | Age: 68
Setting detail: RECURRING SERIES
Discharge: HOME | End: 2023-06-26
Payer: MEDICARE

## 2023-06-23 PROCEDURE — 97110 THERAPEUTIC EXERCISES: CPT

## 2023-06-23 PROCEDURE — 97140 MANUAL THERAPY 1/> REGIONS: CPT

## 2023-06-23 PROCEDURE — G0283 ELEC STIM OTHER THAN WOUND: HCPCS

## 2023-06-27 ENCOUNTER — HOSPITAL ENCOUNTER (OUTPATIENT)
Facility: HOSPITAL | Age: 68
Setting detail: RECURRING SERIES
Discharge: HOME | End: 2023-06-30
Payer: MEDICARE

## 2023-06-27 PROCEDURE — 97140 MANUAL THERAPY 1/> REGIONS: CPT

## 2023-06-27 PROCEDURE — 97535 SELF CARE MNGMENT TRAINING: CPT

## 2023-06-27 PROCEDURE — G0283 ELEC STIM OTHER THAN WOUND: HCPCS

## 2023-07-03 ENCOUNTER — HOSPITAL ENCOUNTER (OUTPATIENT)
Facility: HOSPITAL | Age: 68
Setting detail: RECURRING SERIES
Discharge: HOME | End: 2023-07-06
Payer: MEDICARE

## 2023-07-03 PROCEDURE — G0283 ELEC STIM OTHER THAN WOUND: HCPCS

## 2023-07-03 PROCEDURE — 97140 MANUAL THERAPY 1/> REGIONS: CPT

## 2023-07-03 PROCEDURE — 97110 THERAPEUTIC EXERCISES: CPT

## 2023-07-03 NOTE — PROGRESS NOTES
PHYSICAL / OCCUPATIONAL THERAPY - DAILY TREATMENT NOTE (updated )    Patient Name: Timothy Jimenez    Date: 7/3/2023    : 1955  Insurance: Payor: MEDICARE / Plan: MEDICARE PART A AND B / Product Type: *No Product type* /      Patient  verified Yes     Visit #   Current / Total 19 26   Time   In / Out 1020 1100   Pain   In / Out R 4, L 1 R 5  L 1   Subjective Functional Status/Changes: I have no idea why I am having so much pain in my right arm today. Changes to:  Meds, Allergies, Med Hx, Sx Hx? If yes, update Summary List no       TREATMENT AREA =  Pain in right shoulder [M25.511]  Pain in left shoulder [M25.512]    OBJECTIVE    Modalities Rationale:     decrease inflammation, decrease pain, and increase tissue extensibility to improve patient's ability to progress to PLOF and address remaining functional goals. 10 min [x] Estim Unattended, type/location:  IFC (R) shoulder / ice Right  shoulder                                    [x]  w/ice    []  w/heat    min [] Estim Attended, type/location:                                     []  w/US     []  w/ice    []  w/heat    []  TENS insruct      min []  Mechanical Traction: type/lbs                   []  pro   []  sup   []  int   []  cont    []  before manual    []  after manual    min []  Ultrasound, settings/location:      min []  Iontophoresis w/ dexamethasone, location:                                               []  take home patch       []  in clinic    min  unbill [x]  Ice     []  Heat    location/position:     min []  Paraffin,  details:     min []  Vasopneumatic Device, press/temp:     min []  Elsie Reihc / Antwan Do:     If using vaso (only need to measure limb vaso being performed on)      pre-treatment girth :       post-treatment girth :       measured at (landmark location) :      min []  Other:    Skin assessment post-treatment (if applicable):    [x]  intact    []  redness- no adverse reaction                 []redness - adverse reaction:

## 2023-07-06 ENCOUNTER — HOSPITAL ENCOUNTER (OUTPATIENT)
Facility: HOSPITAL | Age: 68
Setting detail: RECURRING SERIES
Discharge: HOME | End: 2023-07-09
Payer: MEDICARE

## 2023-07-06 PROCEDURE — 97140 MANUAL THERAPY 1/> REGIONS: CPT

## 2023-07-06 PROCEDURE — 97110 THERAPEUTIC EXERCISES: CPT

## 2023-07-06 PROCEDURE — 97112 NEUROMUSCULAR REEDUCATION: CPT

## 2023-07-11 ENCOUNTER — HOSPITAL ENCOUNTER (OUTPATIENT)
Facility: HOSPITAL | Age: 68
Setting detail: RECURRING SERIES
Discharge: HOME | End: 2023-07-14
Payer: MEDICARE

## 2023-07-11 PROCEDURE — 97140 MANUAL THERAPY 1/> REGIONS: CPT

## 2023-07-11 PROCEDURE — G0283 ELEC STIM OTHER THAN WOUND: HCPCS

## 2023-07-11 NOTE — PROGRESS NOTES
PHYSICAL / OCCUPATIONAL THERAPY - DAILY TREATMENT NOTE (updated )    Patient Name: Marleny Yo    Date: 2023    : 1955  Insurance: Payor: MEDICARE / Plan: MEDICARE PART A AND B / Product Type: *No Product type* /      Patient  verified Yes     Visit #   Current / Total 21 26   Time   In / Out 1020 1055   Pain   In / Out R 3, L 1 R 2  L 1   Subjective Functional Status/Changes: It was a very tough procedure with my right shoulder   Changes to:  Meds, Allergies, Med Hx, Sx Hx? If yes, update Summary List no       TREATMENT AREA =  Pain in right shoulder [M25.511]  Pain in left shoulder [M25.512]    OBJECTIVE    Modalities Rationale:     decrease inflammation, decrease pain, and increase tissue extensibility to improve patient's ability to progress to PLOF and address remaining functional goals. 10 min [x] Estim Unattended, type/location:  IFC (R) shoulder / ice Right  shoulder                                    [x]  w/ice    []  w/heat    min [] Estim Attended, type/location:                                     []  w/US     []  w/ice    []  w/heat    []  TENS insruct      min []  Mechanical Traction: type/lbs                   []  pro   []  sup   []  int   []  cont    []  before manual    []  after manual    min []  Ultrasound, settings/location:      min []  Iontophoresis w/ dexamethasone, location:                                               []  take home patch       []  in clinic    min  unbill [x]  Ice     []  Heat    location/position:     min []  Paraffin,  details:     min []  Vasopneumatic Device, press/temp:     min []  Ali Lyme / Mckinley Kalata:     If using vaso (only need to measure limb vaso being performed on)      pre-treatment girth :       post-treatment girth :       measured at (landmark location) :      min []  Other:    Skin assessment post-treatment (if applicable):    [x]  intact    []  redness- no adverse reaction                 []redness - adverse reaction:

## 2023-07-13 ENCOUNTER — HOSPITAL ENCOUNTER (OUTPATIENT)
Facility: HOSPITAL | Age: 68
Setting detail: RECURRING SERIES
Discharge: HOME | End: 2023-07-16
Payer: MEDICARE

## 2023-07-13 PROCEDURE — 97110 THERAPEUTIC EXERCISES: CPT

## 2023-07-13 PROCEDURE — 97140 MANUAL THERAPY 1/> REGIONS: CPT

## 2023-07-13 PROCEDURE — G0283 ELEC STIM OTHER THAN WOUND: HCPCS

## 2023-07-18 ENCOUNTER — HOSPITAL ENCOUNTER (OUTPATIENT)
Facility: HOSPITAL | Age: 68
Setting detail: RECURRING SERIES
Discharge: HOME | End: 2023-07-21
Payer: MEDICARE

## 2023-07-18 PROCEDURE — 97535 SELF CARE MNGMENT TRAINING: CPT

## 2023-07-18 PROCEDURE — 97140 MANUAL THERAPY 1/> REGIONS: CPT

## 2023-07-18 PROCEDURE — 97530 THERAPEUTIC ACTIVITIES: CPT

## 2023-07-18 NOTE — PROGRESS NOTES
PHYSICAL / OCCUPATIONAL THERAPY - DAILY TREATMENT NOTE (updated )    Patient Name: Eugene Main    Date: 2023    : 1955  Insurance: Payor: MEDICARE / Plan: MEDICARE PART A AND B / Product Type: *No Product type* /      Patient  verified Yes     Visit #   Current / Total 23 26   Time   In / Out 1140 1220   Pain   In / Out R 1 R 1   Subjective Functional Status/Changes: I worked my shoulders this past weekend in function and I was very happy with the way they worked. Changes to:  Meds, Allergies, Med Hx, Sx Hx? If yes, update Summary List no       TREATMENT AREA =  Pain in right shoulder [M25.511]  Pain in left shoulder [M25.512]    OBJECTIVE    Modalities Rationale:     decrease inflammation, decrease pain, and increase tissue extensibility to improve patient's ability to progress to PLOF and address remaining functional goals. ND min [x] Estim Unattended, type/location:  IFC (R) shoulder / ice Right  shoulder                                    [x]  w/ice    []  w/heat    min [] Estim Attended, type/location:                                     []  w/US     []  w/ice    []  w/heat    []  TENS insruct      min []  Mechanical Traction: type/lbs                   []  pro   []  sup   []  int   []  cont    []  before manual    []  after manual    min []  Ultrasound, settings/location:      min []  Iontophoresis w/ dexamethasone, location:                                               []  take home patch       []  in clinic    min  unbill [x]  Ice     []  Heat    location/position:     min []  Paraffin,  details:     min []  Vasopneumatic Device, press/temp:     min []  Bear Gomez / Jason Railing:     If using vaso (only need to measure limb vaso being performed on)      pre-treatment girth :       post-treatment girth :       measured at (landmark location) :      min []  Other:    Skin assessment post-treatment (if applicable):    [x]  intact    []  redness- no adverse reaction

## 2023-07-20 ENCOUNTER — HOSPITAL ENCOUNTER (OUTPATIENT)
Facility: HOSPITAL | Age: 68
Setting detail: RECURRING SERIES
Discharge: HOME | End: 2023-07-23
Payer: MEDICARE

## 2023-07-20 PROCEDURE — 97140 MANUAL THERAPY 1/> REGIONS: CPT

## 2023-07-20 PROCEDURE — 97110 THERAPEUTIC EXERCISES: CPT

## 2023-07-20 NOTE — PROGRESS NOTES
PHYSICAL / OCCUPATIONAL THERAPY - DAILY TREATMENT NOTE (updated )    Patient Name: Khushi Green    Date: 2023    : 1955  Insurance: Payor: MEDICARE / Plan: MEDICARE PART A AND B / Product Type: *No Product type* /      Patient  verified Yes     Visit #   Current / Total 24 26   Time   In / Out 1030 1100   Pain   In / Out 1 R, 0 L 1 R, 0 L   Subjective Functional Status/Changes: I am really happy with how I can use my arms and how much the pain has gone away. Changes to:  Meds, Allergies, Med Hx, Sx Hx? If yes, update Summary List no       TREATMENT AREA =  Pain in right shoulder [M25.511]  Pain in left shoulder [M25.512]    OBJECTIVE    Therapeutic Procedures: Tx Min Billable or 1:1 Min (if diff from Tx Min) Procedure, Rationale, Specifics   15 15 98903 Manual Therapy (timed):  decrease pain, increase ROM, and increase tissue extensibility to improve patient's ability to progress to PLOF and address remaining functional goals. The manual therapy interventions were performed at a separate and distinct time from the therapeutic activities interventions . (see flow sheet as applicable)     Details if applicable:  (R) UT STM,GH mob and PROM. 15 15 59368 Therapeutic Exercise (timed):  increase ROM, strength, coordination, balance, and proprioception to improve patient's ability to progress to PLOF and address remaining functional goals.  (see flow sheet as applicable)     Details if applicable:     30 27 [facility] Totals Reminder: bill using total billable min of TIMED therapeutic procedures (example: do not include dry needle or estim unattended, both untimed codes, in totals to left)  8-22 min = 1 unit; 23-37 min = 2 units; 38-52 min = 3 units; 53-67 min = 4 units; 68-82 min = 5 units   Total Total     [x]  Patient Education billed concurrently with other procedures   [x] Review HEP    [] Progressed/Changed HEP, detail:    [] Other detail:       Objective Information/Functional

## 2023-07-25 ENCOUNTER — APPOINTMENT (OUTPATIENT)
Facility: HOSPITAL | Age: 68
End: 2023-07-25
Payer: MEDICARE

## 2023-07-27 ENCOUNTER — APPOINTMENT (OUTPATIENT)
Facility: HOSPITAL | Age: 68
End: 2023-07-27
Payer: MEDICARE

## 2024-07-26 ENCOUNTER — HOSPITAL ENCOUNTER (OUTPATIENT)
Facility: HOSPITAL | Age: 69
Setting detail: RECURRING SERIES
Discharge: HOME | End: 2024-07-29
Payer: MEDICARE

## 2024-07-26 PROCEDURE — 97161 PT EVAL LOW COMPLEX 20 MIN: CPT

## 2024-07-26 PROCEDURE — 97535 SELF CARE MNGMENT TRAINING: CPT

## 2024-07-26 PROCEDURE — 97110 THERAPEUTIC EXERCISES: CPT

## 2024-07-26 NOTE — PROGRESS NOTES
Continue to MONITOR CLOSELY to determine the need for TREATMENT and INCREASE/DECREASE in length of time till next follow up visit. PHYSICAL / OCCUPATIONAL THERAPY - DAILY TREATMENT NOTE    Patient Name: Maricruz Babcock    Date: 2024    : 1955  Insurance: Payor: MEDICARE / Plan: MEDICARE PART A AND B / Product Type: *No Product type* /      Patient  verified Yes     Visit #   Current / Total 1 16   Time   In / Out 9:40 10:20   Pain   In / Out 5 5   Subjective Functional Status/Changes: SEE POC     TREATMENT AREA =  Left ankle pain [M25.572]     OBJECTIVE    15 min untimed eval    Therapeutic Procedures:    Tx Min Billable or 1:1 Min (if diff from Tx Min) Procedure, Rationale, Specifics   15  01031 Therapeutic Exercise (timed):  increase ROM, strength, coordination, balance, and proprioception to improve patient's ability to progress to PLOF and address remaining functional goals. (see flow sheet as applicable)     Details if applicable:       10  13770 Self Care/Home Management (timed):  improve patient knowledge and understanding of pain reducing techniques, home safety, activity modification, diagnosis/prognosis, and physical therapy expectations, procedures and progression  to improve patient's ability to progress to PLOF and address remaining functional goals.  (see flow sheet as applicable)     Details if applicable:            Details if applicable:            Details if applicable:            Details if applicable:     25  [facility] Totals Reminder: bill using total billable min of TIMED therapeutic procedures (example: do not include dry needle or estim unattended, both untimed codes, in totals to left)  8-22 min = 1 unit; 23-37 min = 2 units; 38-52 min = 3 units; 53-67 min = 4 units; 68-82 min = 5 units   Total Total     [x]  Patient Education billed concurrently with other procedures   [x] Review HEP    [] Progressed/Changed HEP, detail:    [] Other detail:       Objective Information/Functional Measures/Assessment    SEE POC    Patient will continue to benefit from skilled PT / OT services to modify and progress therapeutic

## 2024-07-26 NOTE — THERAPY EVALUATION
[facility]  [address], [address], [place] [address] Ph:[phone] Fx: [phone]  Plan of Care / Statement of Necessity for Physical Therapy Services     Patient Name: Maricruz Babcock : 1955   Medical   Diagnosis: Left ankle pain [M25.572]  Treatment Diagnosis:  M25.572  LEFT ANKLE PAIN     Onset Date: 2 months ago     Referral Source: Singh Ruth* Start of Care (SOC): 2024   Prior Hospitalization: See medical history Provider #: [id number]   Prior Level of Function: No ankle pain   Comorbidities:  Musculoskeletal disorders: Arthritis, thyroid problems     Assessment / key information:    Pt is a 69 y.o F who presents to initial PT evaluation who complaints of chronic L ankle pain secondary to 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 Pertinent findings include decreased ankle range of motion, decreased WB on L LE, decreased gastroc and soleus muscle length, and swelling. Her pain is limiting her ability to tolerate prolong standing and walking periods. She presents to therapy in ankle boot with two wedges. Pt given HEP to address tissue restriction and educated on activity modifications to avoid aggravation of symptoms.Pt will benefit with a course of skilled PT to address aforementioned impairments in order to improve quality of life and improve functional mobility to complete ADLs and increase participation at home and the community     Not post operative    Subjective:    Occupation: Retired    KELLEY:  Insidious onset of L ankle pain with diagnosis of calcific achilles tendonitis. Pt reports wearing the ankle boot for the last week and will return to PCP in the next 3 weeks to assess ankle.    Pain: Current: 5/10       Best: 3/10   Worst: 6/10  Location: L achilles tendon  Type: ache, sharp throbbing  Numbness/Tingling: none  Acuity: chronic  Other Symptoms: L knee and R hip pain

## 2024-07-30 ENCOUNTER — HOSPITAL ENCOUNTER (OUTPATIENT)
Facility: HOSPITAL | Age: 69
Setting detail: RECURRING SERIES
Discharge: HOME | End: 2024-08-02
Payer: MEDICARE

## 2024-07-30 PROCEDURE — 97140 MANUAL THERAPY 1/> REGIONS: CPT

## 2024-07-30 PROCEDURE — 97535 SELF CARE MNGMENT TRAINING: CPT

## 2024-07-30 PROCEDURE — 97110 THERAPEUTIC EXERCISES: CPT

## 2024-07-30 NOTE — PROGRESS NOTES
PHYSICAL / OCCUPATIONAL THERAPY - DAILY TREATMENT NOTE    Patient Name: Maricruz Babcock    Date: 2024    : 1955  Insurance: Payor: MEDICARE / Plan: MEDICARE PART A AND B / Product Type: *No Product type* /      Patient  verified Yes     Visit #   Current / Total 2 16   Time   In / Out 9:40 10:20   Pain   In / Out 0 0   Subjective Functional Status/Changes: Pt reports the L ankle pain comes on and off.     TREATMENT AREA =  Left ankle pain [M25.572]     OBJECTIVE    Modalities Rationale:     decrease inflammation, decrease pain, and increase tissue extensibility to improve patient's ability to progress to PLOF and address remaining functional goals.     min [] Estim Unattended, type/location:                                      []  w/ice    []  w/heat    min [] Estim Attended, type/location:                                     []  w/US     []  w/ice    []  w/heat    []  TENS insruct      min []  Mechanical Traction: type/lbs                   []  pro   []  sup   []  int   []  cont    []  before manual    []  after manual   8 min [x]  Ultrasound, settings/location:  Pt supine with bolster on L ankle. 1 Mz 1.1 Hz    min  unbill []  Ice     []  Heat    location/position:     min []  Paraffin,  details:     min []  Vasopneumatic Device, press/temp:     min []  Whirlpool / Fluido:    If using vaso (only need to measure limb vaso being performed on)      pre-treatment girth :       post-treatment girth :       measured at (landmark location) :      min []  Other:    Skin assessment post-treatment:   Intact       Therapeutic Procedures:    Tx Min Billable or 1:1 Min (if diff from Tx Min) Procedure, Rationale, Specifics   15  71696 Therapeutic Exercise (timed):  increase ROM, strength, coordination, balance, and proprioception to improve patient's ability to progress to PLOF and address remaining functional goals. (see flow sheet as applicable)     Details if applicable:       10  53583 Self Care/Home

## 2024-08-02 ENCOUNTER — HOSPITAL ENCOUNTER (OUTPATIENT)
Facility: HOSPITAL | Age: 69
Setting detail: RECURRING SERIES
Discharge: HOME | End: 2024-08-05
Payer: MEDICARE

## 2024-08-02 PROCEDURE — 97110 THERAPEUTIC EXERCISES: CPT

## 2024-08-02 PROCEDURE — 97140 MANUAL THERAPY 1/> REGIONS: CPT

## 2024-08-02 PROCEDURE — 97035 APP MDLTY 1+ULTRASOUND EA 15: CPT

## 2024-08-02 NOTE — PROGRESS NOTES
strength deficits, analyze and address soft tissue restrictions, analyze and cue for proper movement patterns, analyze and modify for postural abnormalities, and instruct in home and community integration to address functional deficits and attain remaining goals.    Progress toward goals / Updated goals:  []  See Progress Note/Recertification    Short Term Goals: To be accomplished in  4  weeks:  1. Independent with HEP.  EVAL: NA  Currently: MET 8/2/2024  2. Decrease max pain 25-50% to assist with ADLs of prolong standing and walking activities.  EVAL: 6/10  3. Pt will be able to ambulate for 5 minutes without increased pain to increase functional mobility.  EVAL: pain in weight bearing    Long Term Goals: To be accomplished in  8 weeks:  1.  Decrease max pain 50-75% to assist with  ADLs of prolong standing and walking activities.  EVAL: 6/10  2.  Improve Lower Extremity Functional Scale (LEFS) 9 in order to show significant functional improvement.  EVAL: 60/10  3.  Pt will be able to ambulate for 8 minutes without increased pain to increase functional mobility.  EVAL:  pain in weight bearing    Next PN/ RC due 8/26/2024  Auth due (visit number/ date) 12/31/2024    PLAN  - Continue Plan of Care  - Upgrade activities as tolerated    Jose Wyatt PT    8/2/2024    6:40 AM  If an interpreting service was utilized for treatment of this patient, the contents of this document represent the material reviewed with the patient via the .     Future Appointments   Date Time Provider Department Center   8/2/2024  9:00 AM Jose Wyatt PT [facility]   8/6/2024  9:40 AM Jose Wyatt PT [facility]   8/9/2024 10:20 AM Jose Wyatt PT [facility]

## 2024-08-06 ENCOUNTER — HOSPITAL ENCOUNTER (OUTPATIENT)
Facility: HOSPITAL | Age: 69
Setting detail: RECURRING SERIES
Discharge: HOME | End: 2024-08-09
Payer: MEDICARE

## 2024-08-06 PROCEDURE — 97110 THERAPEUTIC EXERCISES: CPT

## 2024-08-06 PROCEDURE — 97035 APP MDLTY 1+ULTRASOUND EA 15: CPT

## 2024-08-06 PROCEDURE — 97530 THERAPEUTIC ACTIVITIES: CPT

## 2024-08-09 ENCOUNTER — HOSPITAL ENCOUNTER (OUTPATIENT)
Facility: HOSPITAL | Age: 69
Setting detail: RECURRING SERIES
Discharge: HOME | End: 2024-08-12
Payer: MEDICARE

## 2024-08-09 PROCEDURE — 97035 APP MDLTY 1+ULTRASOUND EA 15: CPT

## 2024-08-09 PROCEDURE — 97140 MANUAL THERAPY 1/> REGIONS: CPT

## 2024-08-09 PROCEDURE — 97110 THERAPEUTIC EXERCISES: CPT

## 2024-08-09 NOTE — PROGRESS NOTES
typical muscle soreness after session.    Patient will continue to benefit from skilled PT / OT services to modify and progress therapeutic interventions, analyze and address functional mobility deficits, analyze and address ROM deficits, analyze and address strength deficits, analyze and address soft tissue restrictions, analyze and cue for proper movement patterns, analyze and modify for postural abnormalities, and instruct in home and community integration to address functional deficits and attain remaining goals.    Progress toward goals / Updated goals:  []  See Progress Note/Recertification    Short Term Goals: To be accomplished in  4  weeks:  1. Independent with HEP.  EVAL: NA  Currently: MET 8/2/2024  2. Decrease max pain 25-50% to assist with ADLs of prolong standing and walking activities.  EVAL: 6/10  3. Pt will be able to ambulate for 5 minutes without increased pain to increase functional mobility.  EVAL: pain in weight bearing    Long Term Goals: To be accomplished in  8 weeks:  1.  Decrease max pain 50-75% to assist with  ADLs of prolong standing and walking activities.  EVAL: 6/10  2.  Improve Lower Extremity Functional Scale (LEFS) 9 in order to show significant functional improvement.  EVAL: 60/10  3.  Pt will be able to ambulate for 8 minutes without increased pain to increase functional mobility.  EVAL:  pain in weight bearing    Next PN/ RC due 8/26/2024  Auth due (visit number/ date) 12/31/2024    PLAN  - Continue Plan of Care  - Upgrade activities as tolerated    Jose Wyatt PT    8/9/2024    6:53 AM  If an interpreting service was utilized for treatment of this patient, the contents of this document represent the material reviewed with the patient via the .     Future Appointments   Date Time Provider Department Center   8/9/2024 10:20 AM Jose Wyatt PT [facility]   8/13/2024  9:40 AM [facility]   8/15/2024 10:20 [facility]   8/22/2024 11:00 AM

## 2024-08-13 ENCOUNTER — HOSPITAL ENCOUNTER (OUTPATIENT)
Facility: HOSPITAL | Age: 69
Setting detail: RECURRING SERIES
Discharge: HOME | End: 2024-08-16
Payer: MEDICARE

## 2024-08-13 PROCEDURE — 97035 APP MDLTY 1+ULTRASOUND EA 15: CPT

## 2024-08-13 PROCEDURE — 97140 MANUAL THERAPY 1/> REGIONS: CPT

## 2024-08-13 PROCEDURE — 97110 THERAPEUTIC EXERCISES: CPT

## 2024-08-13 NOTE — PROGRESS NOTES
PHYSICAL / OCCUPATIONAL THERAPY - DAILY TREATMENT NOTE    Patient Name: Maricruz Babcock    Date: 2024    : 1955  Insurance: Payor: MEDICARE / Plan: MEDICARE PART A AND B / Product Type: *No Product type* /      Patient  verified Yes     Visit #   Current / Total 6 16   Time   In / Out 9:40 10:20   Pain   In / Out 0 0   Subjective Functional Status/Changes: Pt reports no new complaints.     TREATMENT AREA =  Left ankle pain [M25.572]     OBJECTIVE    Modalities Rationale:     decrease inflammation, decrease pain, and increase tissue extensibility to improve patient's ability to progress to PLOF and address remaining functional goals.     min [] Estim Unattended, type/location:                                      []  w/ice    []  w/heat    min [] Estim Attended, type/location:                                     []  w/US     []  w/ice    []  w/heat    []  TENS insruct      min []  Mechanical Traction: type/lbs                   []  pro   []  sup   []  int   []  cont    []  before manual    []  after manual   12 min [x]  Ultrasound, settings/location:  Pt supine with bolster on L ankle. 1 Mz 0.9 Hz    min  unbill []  Ice     []  Heat    location/position:     min []  Paraffin,  details:     min []  Vasopneumatic Device, press/temp:     min []  Whirlpool / Fluido:    If using vaso (only need to measure limb vaso being performed on)      pre-treatment girth :       post-treatment girth :       measured at (landmark location) :      min []  Other:    Skin assessment post-treatment:   Intact       Therapeutic Procedures:    Tx Min Billable or 1:1 Min (if diff from Tx Min) Procedure, Rationale, Specifics   15  16313 Therapeutic Exercise (timed):  increase ROM, strength, coordination, balance, and proprioception to improve patient's ability to progress to PLOF and address remaining functional goals. (see flow sheet as applicable)     Details if applicable:         06475 Self Care/Home Management (timed):

## 2024-08-15 ENCOUNTER — HOSPITAL ENCOUNTER (OUTPATIENT)
Facility: HOSPITAL | Age: 69
Setting detail: RECURRING SERIES
Discharge: HOME | End: 2024-08-18
Payer: MEDICARE

## 2024-08-15 PROCEDURE — 97530 THERAPEUTIC ACTIVITIES: CPT

## 2024-08-15 PROCEDURE — 97110 THERAPEUTIC EXERCISES: CPT

## 2024-08-15 PROCEDURE — 97035 APP MDLTY 1+ULTRASOUND EA 15: CPT

## 2024-08-15 NOTE — PROGRESS NOTES
and understanding of pain reducing techniques, home safety, activity modification, diagnosis/prognosis, and physical therapy expectations, procedures and progression  to improve patient's ability to progress to PLOF and address remaining functional goals.  (see flow sheet as applicable)     Details if applicable:          83727 Manual Therapy (timed):  decrease pain, increase ROM, and increase tissue extensibility to improve patient's ability to progress to PLOF and address remaining functional goals.  The manual therapy interventions were performed at a separate and distinct time from the therapeutic activities interventions . (see flow sheet as applicable)      Details if applicable:    Ankle PROM  DTM of R proximal gastroc muscle belly.   13   23339 Therapeutic Activity (timed):  use of dynamic activities replicating functional movements to increase ROM, strength, coordination, balance, and proprioception in order to improve patient's ability to progress to PLOF and address remaining functional goals.  (see flow sheet as applicable)      Details if applicable:            Details if applicable:     28  [facility] Totals Reminder: bill using total billable min of TIMED therapeutic procedures (example: do not include dry needle or estim unattended, both untimed codes, in totals to left)  8-22 min = 1 unit; 23-37 min = 2 units; 38-52 min = 3 units; 53-67 min = 4 units; 68-82 min = 5 units   Total Total     [x]  Patient Education billed concurrently with other procedures   [x] Review HEP    [] Progressed/Changed HEP, detail:    [] Other detail:       Objective Information/Functional Measures/Assessment  SEE PN    Patient will continue to benefit from skilled PT / OT services to modify and progress therapeutic interventions, analyze and address functional mobility deficits, analyze and address ROM deficits, analyze and address strength deficits, analyze and address soft tissue restrictions, analyze and cue for proper

## 2024-08-15 NOTE — THERAPY RECERTIFICATION
[facility]  [address], [address],[place] [address] - Ph: [phone]  Fx: [phone]  PHYSICAL THERAPY PROGRESS NOTE  Patient Name: Maricruz Babcock : 1955   Treatment/Medical Diagnosis: Left ankle pain [M25.572]   Referral Source: Singh Ruth*     Date of Initial Visit: 2024 Attended Visits: 7 Missed Visits: 0       SUMMARY OF TREATMENT  Pt has been evaluated/assessed and participated in skilled therapeutic exercise, functional activity training,?neuromuscular facilitation and coordination training, patient education,?, modalities including soft tissue massage and ultrasound?and instruction on HEP in order to improve upon ankle ROM, strength, decreased pain, and return to PLOF.    SUBJECTIVE: Pt reports feeling more of a stretch of the L foot since taking off one wedge. Continues to state a \"vice \" feeling of the L ankle.    CURRENT STATUS  Pt has made fair to good progress in PT as demonstrated by decreased max pain levels at 5-6/10 and average pain level of 2-5/10. Pt report 50% improvement since beginning therapy. Pt also notes continued difficulty with prolong weight bearing on L ankle without the boot. Pt is tolerating wearing the boot with 1 less heel wedge with minimal complaints of increased discomfort Pt demonstrates improved left ankle ROM since initial evaluation but mild discomfort in end-range dorsifleion. Pt has a trigger point of L gastroc and is tender to touch, more muscular in nature.  Pt notes having been performing the HEP as prescribed. Pt will continue to benefit from skilled PT to progress exercises and improve upon?functional activities.    OBJECTIVE:   TTP L achilles tendon  TTP L distal gastroc     ROM:        Right A/PROM Left A/PROM Strength   Ankle DF 11 12* 5/5   Ankle PF 41 41 5/5   Ankle EV 11 11 5/5   Ankle IN 5 5       *denotes pain  If blank denotes not tested      ROM: Initial

## 2024-08-22 ENCOUNTER — HOSPITAL ENCOUNTER (OUTPATIENT)
Facility: HOSPITAL | Age: 69
Setting detail: RECURRING SERIES
Discharge: HOME | End: 2024-08-25
Payer: MEDICARE

## 2024-08-22 PROCEDURE — 97530 THERAPEUTIC ACTIVITIES: CPT

## 2024-08-22 PROCEDURE — 97110 THERAPEUTIC EXERCISES: CPT

## 2024-08-22 PROCEDURE — 97035 APP MDLTY 1+ULTRASOUND EA 15: CPT

## 2024-08-22 NOTE — PROGRESS NOTES
PHYSICAL / OCCUPATIONAL THERAPY - DAILY TREATMENT NOTE    Patient Name: Maricruz Babcock    Date: 2024    : 1955  Insurance: Payor: MEDICARE / Plan: MEDICARE PART A AND B / Product Type: *No Product type* /      Patient  verified Yes     Visit #   Current / Total 8 16   Time   In / Out 11 11:40   Pain   In / Out 2 2   Subjective Functional Status/Changes: Pt reports no more heel wedge and is improving her tolerance. Pt want to take a break from therapy at the moment, will call back in the next 2 weeks.     TREATMENT AREA =  Left ankle pain [M25.572]     OBJECTIVE    Modalities Rationale:     decrease inflammation, decrease pain, and increase tissue extensibility to improve patient's ability to progress to PLOF and address remaining functional goals.     min [] Estim Unattended, type/location:                                      []  w/ice    []  w/heat    min [] Estim Attended, type/location:                                     []  w/US     []  w/ice    []  w/heat    []  TENS insruct      min []  Mechanical Traction: type/lbs                   []  pro   []  sup   []  int   []  cont    []  before manual    []  after manual   12 min [x]  Ultrasound, settings/location:  Pt supine with bolster on L ankle. 1 Mz 0.9 Hz    min  unbill []  Ice     []  Heat    location/position:     min []  Paraffin,  details:     min []  Vasopneumatic Device, press/temp:     min []  Whirlpool / Fluido:    If using vaso (only need to measure limb vaso being performed on)      pre-treatment girth :       post-treatment girth :       measured at (landmark location) :      min []  Other:    Skin assessment post-treatment:   Intact       Therapeutic Procedures:    Tx Min Billable or 1:1 Min (if diff from Tx Min) Procedure, Rationale, Specifics   15  17069 Therapeutic Exercise (timed):  increase ROM, strength, coordination, balance, and proprioception to improve patient's ability to progress to PLOF and address remaining functional  goals. (see flow sheet as applicable)     Details if applicable:         81587 Self Care/Home Management (timed):  improve patient knowledge and understanding of pain reducing techniques, home safety, activity modification, diagnosis/prognosis, and physical therapy expectations, procedures and progression  to improve patient's ability to progress to PLOF and address remaining functional goals.  (see flow sheet as applicable)     Details if applicable:          45987 Manual Therapy (timed):  decrease pain, increase ROM, and increase tissue extensibility to improve patient's ability to progress to PLOF and address remaining functional goals.  The manual therapy interventions were performed at a separate and distinct time from the therapeutic activities interventions . (see flow sheet as applicable)      Details if applicable:    Ankle PROM  DTM of R proximal gastroc muscle belly.   13   62431 Therapeutic Activity (timed):  use of dynamic activities replicating functional movements to increase ROM, strength, coordination, balance, and proprioception in order to improve patient's ability to progress to PLOF and address remaining functional goals.  (see flow sheet as applicable)      Details if applicable:            Details if applicable:     28  [facility] Totals Reminder: bill using total billable min of TIMED therapeutic procedures (example: do not include dry needle or estim unattended, both untimed codes, in totals to left)  8-22 min = 1 unit; 23-37 min = 2 units; 38-52 min = 3 units; 53-67 min = 4 units; 68-82 min = 5 units   Total Total     [x]  Patient Education billed concurrently with other procedures   [x] Review HEP    [] Progressed/Changed HEP, detail:    [] Other detail:       Objective Information/Functional Measures/Assessment  Pt presents to therapy with no heel wedge in boot. Pt continues to tolerate ultrasound with no complications. Continued treatment of ankle mobility and strengthening exercises. Pt